# Patient Record
Sex: MALE | Race: WHITE | NOT HISPANIC OR LATINO | Employment: FULL TIME | URBAN - METROPOLITAN AREA
[De-identification: names, ages, dates, MRNs, and addresses within clinical notes are randomized per-mention and may not be internally consistent; named-entity substitution may affect disease eponyms.]

---

## 2017-01-31 ENCOUNTER — ALLSCRIPTS OFFICE VISIT (OUTPATIENT)
Dept: OTHER | Facility: OTHER | Age: 42
End: 2017-01-31

## 2017-01-31 DIAGNOSIS — Z13.6 ENCOUNTER FOR SCREENING FOR CARDIOVASCULAR DISORDERS: ICD-10-CM

## 2017-01-31 DIAGNOSIS — Z13.0 ENCOUNTER FOR SCREENING FOR DISEASES OF THE BLOOD AND BLOOD-FORMING ORGANS AND CERTAIN DISORDERS INVOLVING THE IMMUNE MECHANISM: ICD-10-CM

## 2017-01-31 DIAGNOSIS — Z13.29 ENCOUNTER FOR SCREENING FOR OTHER SUSPECTED ENDOCRINE DISORDER: ICD-10-CM

## 2017-06-01 DIAGNOSIS — R94.6 ABNORMAL RESULTS OF THYROID FUNCTION STUDIES: ICD-10-CM

## 2017-12-18 ENCOUNTER — ALLSCRIPTS OFFICE VISIT (OUTPATIENT)
Dept: OTHER | Facility: OTHER | Age: 42
End: 2017-12-18

## 2017-12-18 DIAGNOSIS — R94.6 ABNORMAL RESULTS OF THYROID FUNCTION STUDIES: ICD-10-CM

## 2017-12-18 DIAGNOSIS — M79.645 PAIN OF FINGER OF LEFT HAND: ICD-10-CM

## 2017-12-19 NOTE — PROGRESS NOTES
Assessment  1  Thumb pain, left (729 5) (M57 805)    Plan  Abnormal thyroid function test    · (1) TSH WITH FT4 REFLEX; Status:Active; Requested for:[id number]; Thumb pain, left    · Diclofenac Sodium 1 % Transdermal Gel; APPLY SPARINGLY TO AFFECTEDAREA(S) TWICE DAILY AS NEEDED   · * XR THUMB LEFT FIRST DIGIT-MIN 2V; Status:Active; Requested for:[id number];    · *1 - Ney Reilly  *Dr Ruiz Falling  Status: Active Requested for: [id number]  Care Summary provided  : Yes    Discussion/Summary    L thumb pain - suspect tendonitis with likely OA  Will obtain xray  He is a , likely will have recurring problem, will send to [facility], consider cortisone injection  Will rx diclofenac cream in the meantime  Possible side effects of new medications were reviewed with the patient/guardian today  The treatment plan was reviewed with the patient/guardian  The patient/guardian understands and agrees with the treatment plan      History of Present Illness  HPI: 45yo R handed male with abn TSH here for evaluation of L hand pain  He developed L thumb pain for one month, tried wearing a brace at work and has been placing ice and anti-inflammatory ointment without relief  Reports swelling  Denies trauma, crepitus, paresthesia  Thumb feels weaker  He is a   Review of Systems   Constitutional: no fever  Musculoskeletal: as noted in HPI  Active Problems  1  Abnormal thyroid function test (794 5) (R94 6)   2  Need for influenza vaccination (V04 81) (Z23)   3  Screening for blood disease (V78 9) (Z13 0)   4  Screening for cardiovascular condition (V81 2) (Z13 6)   5  Screening for thyroid disorder (V77 0) (Z13 29)    Past Medical History  Active Problems And Past Medical History Reviewed: The active problems and past medical history were reviewed and updated today  Surgical History  Surgical History Reviewed: The surgical history was reviewed and updated today         Social History   · Alcohol use (V49 89) (Z78 9)   · Cigarette smoker (305 1) (F17 210)   · Current every day smoker (305 1) (F17 200)   · Occupation   · [employer]   · Single  The social history was reviewed and is unchanged  Family History  Family History Reviewed: The family history was reviewed and updated today  Current Meds   1  Claritin 10 MG Oral Tablet; Therapy: (Recorded:31Jan2017) to Recorded   2  Fish Oil CAPS; Therapy: (Recorded:31Jan2017) to Recorded   3  Multivitamins Oral Capsule Recorded    The medication list was reviewed and updated today  Allergies  1  No Known Drug Allergies  2  Pollen   3  Ragweed   4  Seasonal    Vitals   Recorded: 64VXJ7554 08:23AM   Temperature 97 8 F   Heart Rate 69   Respiration 16   Systolic 067   Diastolic 86   Height 6 ft 2 in   Weight 222 lb    BMI Calculated 28 5   BSA Calculated 2 27   O2 Saturation 98     Physical Exam   Constitutional  General appearance: No acute distress, well appearing and well nourished  Cardiovascular radial pulse 2/4 b/l  Musculoskeletal  Inspection/palpation of joints, bones, and muscles: Abnormal  --  strength 5/5 b/l  L Thumb ROM WNL  Warm L thumb MCP joing with pain on palpation    Psychiatric  Orientation to person, place and time: Normal          Future Appointments    Date/Time Provider Specialty Site   02/06/2018 02:00 PM Shireen Astudillo DO Internal Medicine [facility] INTERNAL MED     Signatures   Electronically signed by : Naina Austin DO; Dec 18 2017  8:45AM EST                       (Author)

## 2018-01-12 NOTE — PROGRESS NOTES
Assessment    1  Encounter for preventive health examination (V70 0) (Z00 00)    Plan  Health Maintenance    · Begin a limited exercise program ; Status:Complete;   Done: 25DTM8141   · Drink plenty of fluids ; Status:Complete;   Done: 92MPF2434   · Eat a low fat and low cholesterol diet ; Status:Complete;   Done: 67HSN0852   · Limit your use of alcohol to 2 drinks or cans of beer a day ; Status:Complete;   Done:  94LQX4827   · Some eating tips that can help you lose weight ; Status:Complete;   Done: 28PWY6369   · We recommend that you follow these steps to lower your risk of osteoporosis  ;  Status:Complete;   Done: 48MOS0128   · You need to quit smoking ; Status:Complete;   Done: 19FSR9479   · Follow-up visit in 1 year Evaluation and Treatment  Physical  Status: Hold For -  Scheduling  Requested for: 57PKX6087  Screening for blood disease    · (1) CBC/ PLT (NO DIFF); Status:Active; Requested HFB:[id number];   Screening for cardiovascular condition    · (1) COMPREHENSIVE METABOLIC PANEL; Status:Active; Requested LUW:[id number];    · (1) LIPID PANEL FASTING W DIRECT LDL REFLEX; Status:Active; Requested  RZO:[id number];    · (1) URINALYSIS (will reflex a microscopy if leukocytes, occult blood, protein or nitrites  are not within normal limits); Status:Active; Requested JOL:46VLB0216;   Screening for thyroid disorder    · (1) TSH WITH FT4 REFLEX; Status:Active; Requested MIW:48CHF9205;     Discussion/Summary  Impression: health maintenance visit, healthy adult male  Currently, he eats a healthy diet and has an adequate exercise regimen  Prostate cancer screening: PSA is not indicated  Testicular cancer screening: the risks and benefits of testicular cancer screening were discussed and monthly self testicular exam was advised  Colorectal cancer screening: colorectal cancer screening is not indicated   The risks and benefits of immunizations were discussed, immunizations are needed and immunizations will be given as outlined in the orders  Advice and education were given regarding nutrition, aerobic exercise, weight bearing exercise, weight loss, calcium supplements, vitamin D supplements, tobacco cessation and alcohol use  Patient discussion: discussed with the patient, 40 minute visit, greater than half of the time was spent on counseling  Depression screen - negative  History of Present Illness  HM, Adult Male: The patient is being seen for a health maintenance evaluation  The last health maintenance visit was more than 1 year(s) ago  Social History: Household members include domestic partner  He is unmarried  Work status: working full time and occupation: [employer]  The patient is a current cigarette smoker  He reports occasional alcohol use  General Health: The patient's health since the last visit is described as good  He has regular dental visits (Followed by his cousin Dr Abdiaziz Cuevas)  The patient brushes time(s) a day, flosses time(s) a day, reports his last dental visit was 12/2016 and goes every 4 months  He denies vision problems  Vision care includes no need for vision correction  He denies hearing loss  Immunizations status: not up to date The patient needs the following immunization(s): tetanus vaccine and influenza vaccine  Lifestyle:  He consumes a diverse and healthy diet  He is on a low salt and eats healthy during the week but weekends has a regular diet diet  He exercises regularly  He exercises 4-5 times per week  Exercise includes aerobic conditioning and strength training  He uses tobacco  He consumes alcohol  He reports occasional alcohol use and drinking 3-5 drinks per week  Reproductive health:  the patient is sexually active  birth control is not being practiced  He denies erectile dysfunction  Screening: Prostate cancer screening includes no previous evaluation  Testicular cancer screening includes no previous evaluation  Colorectal cancer screening includes no previous screening  Metabolic screening includes lipid profile performed 2 years, glucose screening performed 2 years, thyroid function test performed 2 years and no previous DEXA  Safety elements used: seat belt, bicycle helmet, sunscreen, smoke detector and carbon monoxide detector  HPI: 34yo male here as a new patient  Review of Systems    Constitutional: No fever or chills, feels well, no tiredness, no recent weight gain or weight loss  Cardiovascular: No complaints of slow heart rate, no fast heart rate, no chest pain, no palpitations, no leg claudication, no lower extremity  Respiratory: No complaints of shortness of breath, no wheezing, no cough, no SOB on exertion, no orthopnea or PND  Gastrointestinal: heartburn  Genitourinary: No complaints of dysuria, no incontinence, no hesitancy, no nocturia, no genital lesion, no testicular pain  Musculoskeletal: finger problems and foot problems  Neurological: No compliants of headache, no confusion, no convulsions, no numbness or tingling, no dizziness or fainting, no limb weakness, no difficulty walking  Psychiatric: Is not suicidal, no sleep disturbances, no anxiety or depression, no change in personality, no emotional problems  Past Medical History    · History of Patient denies medical problems (V4) (Z78 9)    Surgical History    · History of Dental Surgery   · History of Inguinal Hernia Repair    Family History  Father    · Family history of   Paternal Grandfather    · Family history of diabetes mellitus (V18 0) (Z83 3)  Paternal Uncle    · Family history of diabetes mellitus (V18 0) (Z83 3)    Social History    · Alcohol use (V49 89) (Z78 9)   · Cigarette smoker (305 1) (F17 210)   · Occupation   · Cook   · Single    Current Meds   1  Claritin 10 MG Oral Tablet; Therapy: (Recorded:2017) to Recorded   2  Fish Oil CAPS; Therapy: (Recorded:2017) to Recorded   3  Multivitamins Oral Capsule Recorded    Allergies    1   No Known Drug Allergies    2  Pollen   3  Ragweed   4  Seasonal    Vitals   Recorded: 20YCI0192 02:22PM Recorded: 65QKZ6673 02:18PM   Temperature 97 7 F    Heart Rate 83    Respiration  16   Systolic 488    Diastolic 80    Height  6 ft 2 in   Weight  228 lb 6 08 oz   BMI Calculated  29 32   BSA Calculated  2 3   O2 Saturation 98      Physical Exam    Constitutional   General appearance: No acute distress, well appearing and well nourished  Head and Face   Head and face: Normal     Eyes   Conjunctiva and lids: No erythema, swelling or discharge  Ears, Nose, Mouth, and Throat   External inspection of ears and nose: Normal     Otoscopic examination: Tympanic membranes translucent with normal light reflex  Canals patent without erythema  Hearing: Normal     Nasal mucosa, septum, and turbinates: Normal without edema or erythema  Lips, teeth, and gums: Normal, good dentition  Oropharynx: Normal with no erythema, edema, exudate or lesions  Neck   Neck: Supple, symmetric, trachea midline, no masses  Thyroid: Normal, no thyromegaly  Pulmonary   Respiratory effort: No increased work of breathing or signs of respiratory distress  Auscultation of lungs: Clear to auscultation  Cardiovascular   Auscultation of heart: Normal rate and rhythm, normal S1 and S2, no murmurs  Carotid pulses: 2+ bilaterally  Pedal pulses: 2+ bilaterally  Examination of extremities for edema and/or varicosities: Normal     Abdomen   Abdomen: Non-tender, no masses  The abdomen was obese  Bowel sounds were normal  The abdomen was soft and nontender  The abdomen was normal to percussion  Lymphatic   Palpation of lymph nodes in neck: No lymphadenopathy  Musculoskeletal   Gait and station: Normal     Neurologic No focal deficit     Psychiatric   Orientation to person, place and time: Normal     Mood and affect: Normal        Results/Data  PHQ-2 Adult Depression Screening 31Jan2017 02:40PM [employer]     Test Name Result Flag Reference   PHQ-2 Adult Depression Score 0     Over the last two weeks, how often have you been bothered by any of the following problems?   Little interest or pleasure in doing things: Not at all - 0  Feeling down, depressed, or hopeless: Not at all - 0   PHQ-2 Adult Depression Screening Negative         Signatures   Electronically signed by : Suzan Magdaleno DO; Jan 31 2017  2:56PM EST                       (Author)

## 2018-01-15 VITALS
WEIGHT: 228.38 LBS | RESPIRATION RATE: 16 BRPM | TEMPERATURE: 97.7 F | BODY MASS INDEX: 29.31 KG/M2 | HEIGHT: 74 IN | OXYGEN SATURATION: 98 % | SYSTOLIC BLOOD PRESSURE: 125 MMHG | HEART RATE: 83 BPM | DIASTOLIC BLOOD PRESSURE: 80 MMHG

## 2018-01-23 VITALS
HEART RATE: 69 BPM | DIASTOLIC BLOOD PRESSURE: 86 MMHG | BODY MASS INDEX: 28.49 KG/M2 | HEIGHT: 74 IN | TEMPERATURE: 97.8 F | SYSTOLIC BLOOD PRESSURE: 122 MMHG | OXYGEN SATURATION: 98 % | RESPIRATION RATE: 16 BRPM | WEIGHT: 222 LBS

## 2018-03-13 ENCOUNTER — TRANSCRIBE ORDERS (OUTPATIENT)
Dept: RADIOLOGY | Facility: HOSPITAL | Age: 43
End: 2018-03-13

## 2018-03-13 ENCOUNTER — HOSPITAL ENCOUNTER (OUTPATIENT)
Dept: RADIOLOGY | Facility: HOSPITAL | Age: 43
Discharge: HOME | End: 2018-03-13
Payer: COMMERCIAL

## 2018-03-13 DIAGNOSIS — M79.645 PAIN OF FINGER OF LEFT HAND: ICD-10-CM

## 2018-03-13 PROCEDURE — 73140 X-RAY EXAM OF FINGER(S): CPT

## 2018-03-20 ENCOUNTER — TELEPHONE (OUTPATIENT)
Dept: INTERNAL MEDICINE CLINIC | Facility: CLINIC | Age: 43
End: 2018-03-20

## 2018-03-20 DIAGNOSIS — R94.6 ABNORMAL THYROID FUNCTION TEST: Primary | ICD-10-CM

## 2018-03-20 NOTE — TELEPHONE ENCOUNTER
Pt scheduled physical for next week  He does not have any BW ordered (pre-Epic)  Would you be able to place orders for the required labs and I will notify the Pt once they have been placed  Thank you!

## 2018-03-21 PROBLEM — M79.645 THUMB PAIN, LEFT: Status: ACTIVE | Noted: 2017-12-18

## 2018-03-21 PROBLEM — M19.049 CMC ARTHRITIS: Status: ACTIVE | Noted: 2018-03-21

## 2018-03-21 PROBLEM — R94.6 ABNORMAL THYROID FUNCTION TEST: Status: ACTIVE | Noted: 2017-06-01

## 2018-03-21 NOTE — TELEPHONE ENCOUNTER
As per my last note 12/18/17, only lab ordered to be done prior to our visit is a TSH  New script placed in EPIC

## 2018-03-27 ENCOUNTER — APPOINTMENT (OUTPATIENT)
Dept: LAB | Facility: HOSPITAL | Age: 43
End: 2018-03-27
Payer: COMMERCIAL

## 2018-03-27 DIAGNOSIS — R94.6 ABNORMAL THYROID FUNCTION TEST: ICD-10-CM

## 2018-03-27 LAB
T4 FREE SERPL-MCNC: 0.8 NG/DL (ref 0.76–1.46)
TSH SERPL DL<=0.05 MIU/L-ACNC: 5.23 UIU/ML (ref 0.36–3.74)

## 2018-03-27 PROCEDURE — 36415 COLL VENOUS BLD VENIPUNCTURE: CPT

## 2018-03-27 PROCEDURE — 84443 ASSAY THYROID STIM HORMONE: CPT

## 2018-03-27 PROCEDURE — 84439 ASSAY OF FREE THYROXINE: CPT

## 2018-03-28 ENCOUNTER — OFFICE VISIT (OUTPATIENT)
Dept: INTERNAL MEDICINE CLINIC | Facility: CLINIC | Age: 43
End: 2018-03-28
Payer: COMMERCIAL

## 2018-03-28 VITALS
SYSTOLIC BLOOD PRESSURE: 122 MMHG | BODY MASS INDEX: 27.36 KG/M2 | WEIGHT: 213.2 LBS | HEART RATE: 85 BPM | DIASTOLIC BLOOD PRESSURE: 80 MMHG | HEIGHT: 74 IN | TEMPERATURE: 97.8 F | RESPIRATION RATE: 16 BRPM | OXYGEN SATURATION: 97 %

## 2018-03-28 DIAGNOSIS — Z13.6 SCREENING FOR CARDIOVASCULAR CONDITION: ICD-10-CM

## 2018-03-28 DIAGNOSIS — R94.6 ABNORMAL THYROID FUNCTION TEST: ICD-10-CM

## 2018-03-28 DIAGNOSIS — Z00.00 ROUTINE ADULT HEALTH MAINTENANCE: Primary | ICD-10-CM

## 2018-03-28 PROBLEM — M79.645 THUMB PAIN, LEFT: Status: RESOLVED | Noted: 2017-12-18 | Resolved: 2018-03-28

## 2018-03-28 PROCEDURE — 99396 PREV VISIT EST AGE 40-64: CPT | Performed by: INTERNAL MEDICINE

## 2018-03-28 NOTE — PROGRESS NOTES
Assessment/Plan:    No problem-specific Assessment & Plan notes found for this encounter  1  Routine adult health maintenance     2  Screening for cardiovascular condition  Comprehensive metabolic panel   3  Abnormal thyroid function test  TSH, 3rd generation with T4 reflex       Subjective:      Patient ID: Nico Feliciano is a 36 y o  male  HPI   43yo male with abn TSH and L thumb CMC arthritis s/p cortisone injection here for yearly physical     Last dental visit 1/2018   Last eye visit never    Immunization History   Administered Date(s) Administered    Influenza Quadrivalent Preservative Free 3 years and older IM 01/31/2017    Influenza Quadrivalent, 6-35 Months IM 12/18/2017       Lifestyle:    Diet well balanced, low carb and low sodium               Physical activity goes to the gym 4-5x per week    reports that he drinks alcohol  reports that he has been smoking  He has never used smokeless tobacco     reports that he uses drugs, including Marijuana, about 3 times per week  Reproductive:     reports that he currently engages in sexual activity and has had female partners  Currently sexually active   Erectile dysfunction No    Cancer Screenings:   Testicular irregular screenings   Last PSA N/A   Colonoscopy N/A         The following portions of the patient's history were reviewed and updated as appropriate: allergies, current medications, past family history, past medical history, past social history, past surgical history and problem list     Current Outpatient Prescriptions:     loratadine (CLARITIN) 10 mg tablet, Take 10 mg by mouth, Disp: , Rfl:     Multiple Vitamin (MULTIVITAMINS PO), Take by mouth, Disp: , Rfl:     Omega-3 Fatty Acids (FISH OIL) 645 MG CAPS, Take by mouth, Disp: , Rfl:     diclofenac sodium (VOLTAREN) 1 %, Place on the skin 2 (two) times a day as needed, Disp: , Rfl:       Review of Systems   Constitutional: Negative  HENT: Negative  Respiratory: Negative  Cardiovascular: Negative  Gastrointestinal: Negative  Genitourinary: Negative  Musculoskeletal:        Thumb pain   Neurological: Negative  Psychiatric/Behavioral: Negative  Objective:    /80 (BP Location: Left arm, Patient Position: Sitting)   Pulse 85   Temp 97 8 °F (36 6 °C)   Resp 16   Ht 6' 2" (1 88 m)   Wt 96 7 kg (213 lb 3 2 oz)   SpO2 97%   BMI 27 37 kg/m²      Physical Exam   Constitutional: He is oriented to person, place, and time  He appears well-nourished  No distress  HENT:   Right Ear: External ear normal    Left Ear: External ear normal    Nose: Nose normal    Mouth/Throat: Oropharynx is clear and moist  No oropharyngeal exudate  Eyes: EOM are normal  Pupils are equal, round, and reactive to light  Neck: Neck supple  No thyromegaly present  Cardiovascular: Normal rate, regular rhythm, normal heart sounds and intact distal pulses  Pulmonary/Chest: Breath sounds normal  No respiratory distress  He has no wheezes  Abdominal: Soft  Bowel sounds are normal  He exhibits no distension  There is no tenderness  Neurological: He is alert and oriented to person, place, and time  Psychiatric: He has a normal mood and affect  His behavior is normal    Vitals reviewed            Recent Results (from the past 168 hour(s))   TSH, 3rd generation with T4 reflex    Collection Time: 03/27/18 10:23 AM   Result Value Ref Range    TSH 3RD GENERATON 5 230 (H) 0 358 - 3 740 uIU/mL   T4, free    Collection Time: 03/27/18 10:23 AM   Result Value Ref Range    Free T4 0 80 0 76 - 1 46 ng/dL     PHQ-9 Depression Screening    PHQ-9:    Frequency of the following problems over the past two weeks:       Little interest or pleasure in doing things:  0 - not at all  Feeling down, depressed, or hopeless:  0 - not at all  PHQ-2 Score:  0

## 2018-04-05 ENCOUNTER — OFFICE VISIT (OUTPATIENT)
Dept: INTERNAL MEDICINE CLINIC | Facility: CLINIC | Age: 43
End: 2018-04-05
Payer: COMMERCIAL

## 2018-04-05 VITALS
RESPIRATION RATE: 14 BRPM | DIASTOLIC BLOOD PRESSURE: 98 MMHG | TEMPERATURE: 96.7 F | BODY MASS INDEX: 27.31 KG/M2 | HEART RATE: 85 BPM | HEIGHT: 74 IN | OXYGEN SATURATION: 98 % | WEIGHT: 212.8 LBS | SYSTOLIC BLOOD PRESSURE: 168 MMHG

## 2018-04-05 DIAGNOSIS — R10.32 LEFT GROIN PAIN: Primary | ICD-10-CM

## 2018-04-05 PROCEDURE — 99213 OFFICE O/P EST LOW 20 MIN: CPT | Performed by: INTERNAL MEDICINE

## 2018-04-05 NOTE — ASSESSMENT & PLAN NOTE
eval with u/s  Advised using NSAIDs for pain control, avoiding activities that will increase intra-abd pressure and wear scrotal support in the meantime

## 2018-04-05 NOTE — PROGRESS NOTES
Assessment/Plan:    Left groin pain  eval with u/s  Advised using NSAIDs for pain control, avoiding activities that will increase intra-abd pressure and wear scrotal support in the meantime  1  Left groin pain  US groin/inguinal area       Subjective:      Patient ID: Scar Gongora is a 36 y o  male  HPI    45yo male with L thumb OA and abn TSH here for evaluation of L groin pain  One week ago he developed L groin pain, iced the area  Had sexual intercourse the next night, had hemospermia  Discomfort is present all the time but does wax and wane, worse when he is walking around, going up the steps, having a Bm  He He feels a bulge superior to L testicle  He denies dysuria, urinary frequency, back pain, scrotal swelling or fever  He has taken motrin for the pain  Had L hernia repair in 2016  The following portions of the patient's history were reviewed and updated as appropriate: allergies, current medications, past family history, past medical history, past social history, past surgical history and problem list     Current Outpatient Prescriptions:     diclofenac sodium (VOLTAREN) 1 %, Place on the skin 2 (two) times a day as needed, Disp: , Rfl:     loratadine (CLARITIN) 10 mg tablet, Take 10 mg by mouth, Disp: , Rfl:     Multiple Vitamin (MULTIVITAMINS PO), Take by mouth, Disp: , Rfl:     Omega-3 Fatty Acids (FISH OIL) 645 MG CAPS, Take by mouth, Disp: , Rfl:     Review of Systems   Constitutional: Negative for fever  Gastrointestinal: Negative  Genitourinary: Negative for difficulty urinating, dysuria, hematuria, penile swelling, scrotal swelling and testicular pain  Hemospermia x1       Objective:    /98   Pulse 85   Temp (!) 96 7 °F (35 9 °C)   Resp 14   Ht 6' 2" (1 88 m)   Wt 96 5 kg (212 lb 12 8 oz)   SpO2 98%   BMI 27 32 kg/m²      Physical Exam   Constitutional: He appears well-nourished  Abdominal: Soft  Bowel sounds are normal  He exhibits no distension  There is no tenderness  Genitourinary: Left testis shows tenderness  Left testis shows no swelling  Genitourinary Comments: Unable to palpate L inguinal bulge but tenderness is appreciated   Lymphadenopathy:        Left: No inguinal adenopathy present  Vitals reviewed

## 2018-04-10 ENCOUNTER — HOSPITAL ENCOUNTER (OUTPATIENT)
Dept: RADIOLOGY | Facility: HOSPITAL | Age: 43
Discharge: HOME | End: 2018-04-10
Payer: COMMERCIAL

## 2018-04-10 DIAGNOSIS — R10.32 LEFT GROIN PAIN: ICD-10-CM

## 2018-04-10 PROCEDURE — 76705 ECHO EXAM OF ABDOMEN: CPT

## 2018-04-17 ENCOUNTER — TELEPHONE (OUTPATIENT)
Dept: INTERNAL MEDICINE CLINIC | Facility: CLINIC | Age: 43
End: 2018-04-17

## 2018-04-18 DIAGNOSIS — R36.1 HEMOSPERMIA: Primary | ICD-10-CM

## 2018-04-18 DIAGNOSIS — R10.32 LEFT GROIN PAIN: ICD-10-CM

## 2018-04-24 ENCOUNTER — HOSPITAL ENCOUNTER (OUTPATIENT)
Dept: RADIOLOGY | Facility: HOSPITAL | Age: 43
Discharge: HOME | End: 2018-04-24
Payer: COMMERCIAL

## 2018-04-24 ENCOUNTER — TRANSCRIBE ORDERS (OUTPATIENT)
Dept: RADIOLOGY | Facility: HOSPITAL | Age: 43
End: 2018-04-24

## 2018-04-24 DIAGNOSIS — R36.1 HEMOSPERMIA: ICD-10-CM

## 2018-04-24 DIAGNOSIS — R10.32 LEFT GROIN PAIN: ICD-10-CM

## 2018-04-24 PROCEDURE — 76870 US EXAM SCROTUM: CPT

## 2018-04-30 ENCOUNTER — TELEPHONE (OUTPATIENT)
Dept: INTERNAL MEDICINE CLINIC | Facility: CLINIC | Age: 43
End: 2018-04-30

## 2018-04-30 DIAGNOSIS — R36.1 HEMOSPERMIA: ICD-10-CM

## 2018-04-30 DIAGNOSIS — I86.1 BILATERAL VARICOCELES: Primary | ICD-10-CM

## 2018-04-30 NOTE — TELEPHONE ENCOUNTER
Patient would like to know the results of his ultrasound  He also reports that all over is body is red and has a rash since Saturday   It has not improved at all

## 2018-05-01 ENCOUNTER — OFFICE VISIT (OUTPATIENT)
Dept: INTERNAL MEDICINE CLINIC | Facility: CLINIC | Age: 43
End: 2018-05-01
Payer: COMMERCIAL

## 2018-05-01 VITALS
HEART RATE: 81 BPM | WEIGHT: 213.4 LBS | TEMPERATURE: 97.7 F | SYSTOLIC BLOOD PRESSURE: 120 MMHG | OXYGEN SATURATION: 98 % | RESPIRATION RATE: 16 BRPM | BODY MASS INDEX: 27.39 KG/M2 | HEIGHT: 74 IN | DIASTOLIC BLOOD PRESSURE: 80 MMHG

## 2018-05-01 DIAGNOSIS — R21 RASH: Primary | ICD-10-CM

## 2018-05-01 PROCEDURE — 99213 OFFICE O/P EST LOW 20 MIN: CPT | Performed by: INTERNAL MEDICINE

## 2018-05-01 RX ORDER — METHYLPREDNISOLONE 4 MG/1
TABLET ORAL
Qty: 21 TABLET | Refills: 0 | Status: SHIPPED | OUTPATIENT
Start: 2018-05-01 | End: 2018-08-14 | Stop reason: ALTCHOICE

## 2018-05-01 NOTE — PROGRESS NOTES
Assessment/Plan:    1  Rash  Methylprednisolone 4 MG TBPK    advised to start medrol dose mic, side effects reviewed  if no response obtain labs  Subjective:      Patient ID: Buck Siu is a 36 y o  male  HPI   45yo male here for evaluation of a rash  He noticed a rash three days ago on his forearm that has spread  No new medications, foods, lotions or sprays or fever  Denies pruritis but feels tingly  He used benadryl spray and hydrocortisone cream with some relief  He has been taking loratadine with some relief  The following portions of the patient's history were reviewed and updated as appropriate: allergies, current medications, past family history, past medical history, past social history, past surgical history and problem list     Current Outpatient Prescriptions:     diclofenac sodium (VOLTAREN) 1 %, Place on the skin 2 (two) times a day as needed, Disp: , Rfl:     loratadine (CLARITIN) 10 mg tablet, Take 10 mg by mouth, Disp: , Rfl:     Multiple Vitamin (MULTIVITAMINS PO), Take by mouth, Disp: , Rfl:     Omega-3 Fatty Acids (FISH OIL) 645 MG CAPS, Take by mouth, Disp: , Rfl:     Methylprednisolone 4 MG TBPK, Use as directed on package, Disp: 21 tablet, Rfl: 0    Review of Systems   Constitutional: Negative for fever  Musculoskeletal: Negative for arthralgias  Skin: Positive for rash  Objective:    /80 (BP Location: Left arm, Patient Position: Sitting)   Pulse 81   Temp 97 7 °F (36 5 °C)   Resp 16   Ht 6' 2" (1 88 m)   Wt 96 8 kg (213 lb 6 4 oz)   SpO2 98%   BMI 27 40 kg/m²      Physical Exam   Constitutional: He is oriented to person, place, and time  He appears well-developed and well-nourished  Neurological: He is oriented to person, place, and time  Skin: Skin is warm  Rash noted  Diffuse maculopapular rash on bilateral arms, groin and bilateral lower extremities  Vitals reviewed

## 2018-05-09 ENCOUNTER — OFFICE VISIT (OUTPATIENT)
Dept: UROLOGY | Facility: AMBULATORY SURGERY CENTER | Age: 43
End: 2018-05-09
Payer: COMMERCIAL

## 2018-05-09 VITALS
HEIGHT: 74 IN | SYSTOLIC BLOOD PRESSURE: 128 MMHG | BODY MASS INDEX: 26.92 KG/M2 | WEIGHT: 209.8 LBS | HEART RATE: 76 BPM | DIASTOLIC BLOOD PRESSURE: 88 MMHG

## 2018-05-09 DIAGNOSIS — R36.1 HEMOSPERMIA: ICD-10-CM

## 2018-05-09 DIAGNOSIS — I86.1 BILATERAL VARICOCELES: Primary | ICD-10-CM

## 2018-05-09 DIAGNOSIS — R10.32 LEFT GROIN PAIN: ICD-10-CM

## 2018-05-09 DIAGNOSIS — A63.0 CONDYLOMA ACUMINATUM IN MALE: ICD-10-CM

## 2018-05-09 LAB
SL AMB  POCT GLUCOSE, UA: NORMAL
SL AMB LEUKOCYTE ESTERASE,UA: NORMAL
SL AMB POCT BLOOD,UA: NORMAL
SL AMB POCT CLARITY,UA: CLEAR
SL AMB POCT COLOR,UA: YELLOW
SL AMB POCT KETONES,UA: NORMAL
SL AMB POCT NITRITE,UA: NORMAL
SL AMB POCT PH,UA: 6.5
SL AMB POCT SPECIFIC GRAVITY,UA: 1.01
SL AMB POCT URINE PROTEIN: NORMAL

## 2018-05-09 PROCEDURE — 99244 OFF/OP CNSLTJ NEW/EST MOD 40: CPT | Performed by: UROLOGY

## 2018-05-09 PROCEDURE — 81002 URINALYSIS NONAUTO W/O SCOPE: CPT | Performed by: UROLOGY

## 2018-05-09 NOTE — PROGRESS NOTES
5/9/2018    Hali Yuan  1975  [id number]        Assessment  Condyloma, orchialgia, hematospermia       Discussion  I provided the patient with reassurance that his transient hematospermia a is a benign condition that requires no further evaluation or treatment  With regards to his orchialgia I recommend scrotal support and NSAID as needed  I provided him with reassurance that there are no abnormalities on physical examination of his genitalia  There are no inguinal hernias and no testicular masses  I did review 2 small lesions identified on the shaft of the phallus which are most consistent with condyloma  We discussed sharp excision in the office versus laser excision in the operating room  The patient wishes to proceed with sharp excision in the office  History of Present Illness  36 y o  male with a history of intermittent left-sided orchialgia over the course of the last 8 weeks  He also had some associated hematospermia  Both are resolving  He denies any lower urinary tract symptoms  He denies any history of STDs  He is currently sexually active with 1 partner only  He states that he had a recent systemic rash and was treated with steroids  He states that he is otherwise healthy without significant past medical or surgical history other than left inguinal hernia repair approximately 2 years ago  AUA Symptom Score  AUA SYMPTOM SCORE      Most Recent Value   AUA SYMPTOM SCORE   How often have you had a sensation of not emptying your bladder completely after you finished urinating? 1   How often have you had to urinate again less than two hours after you finished urinating? 1   How often have you found you stopped and started again several times when you urinate?  0   How often have you found it difficult to postpone urination? 0   How often have you had a weak urinary stream?  1   How often have you had to push or strain to begin urination?   0   How many times did you most typically get up to urinate from the time you went to bed at night until the time you got up in the morning? 1   Quality of Life: If you were to spend the rest of your life with your urinary condition just the way it is now, how would you feel about that?  1   AUA SYMPTOM SCORE  4          Review of Systems  Review of Systems   Constitutional: Negative  HENT: Negative  Eyes: Negative  Respiratory: Negative  Cardiovascular: Negative  Gastrointestinal: Negative  Endocrine: Negative  Genitourinary:        Hematospermia, left testicular pain   Musculoskeletal: Negative  Skin: Negative  Allergic/Immunologic: Negative  Neurological: Negative  Hematological: Negative  Psychiatric/Behavioral: Negative  All other systems reviewed and are negative  Past Medical History  History reviewed  No pertinent past medical history  Past Social History  Past Surgical History:   Procedure Laterality Date    HERNIA REPAIR Left 2016    WISDOM TOOTH EXTRACTION  2014       Past Family History  Family History   Problem Relation Age of Onset    No Known Problems Mother     Diabetes Paternal Grandfather     Diabetes Paternal Uncle        Past Social history  Social History     Social History    Marital status: Single     Spouse name: N/A    Number of children: N/A    Years of education: N/A     Occupational History    Not on file       Social History Main Topics    Smoking status: Current Every Day Smoker    Smokeless tobacco: Never Used      Comment: 3 packs per week    Alcohol use Yes      Comment: drinks 6 drinks per week    Drug use: Yes     Frequency: 3 0 times per week     Types: Marijuana      Comment: denies IVDA    Sexual activity: Yes     Partners: Female     Other Topics Concern    Not on file     Social History Narrative           Current Medications  Current Outpatient Prescriptions   Medication Sig Dispense Refill    diclofenac sodium (VOLTAREN) 1 % Place on the skin 2 (two) times a day as needed      loratadine (CLARITIN) 10 mg tablet Take 10 mg by mouth      Multiple Vitamin (MULTIVITAMINS PO) Take by mouth      Omega-3 Fatty Acids (FISH OIL) 645 MG CAPS Take by mouth      Methylprednisolone 4 MG TBPK Use as directed on package 21 tablet 0     No current facility-administered medications for this visit  Allergies  No Known Allergies    Past Medical History, Social History, Family History, medications and allergies were reviewed  Vitals  Vitals:    05/09/18 0753   BP: 128/88   BP Location: Left arm   Patient Position: Sitting   Cuff Size: Adult   Pulse: 76   Weight: 95 2 kg (209 lb 12 8 oz)   Height: 6' 2" (1 88 m)       Physical Exam  Physical Exam    On examination he is in no acute distress  His abdomen is soft nontender nondistended  A left inguinal hernia scars appreciated  There are no recurrent hernias  Testicular examination reveals no masses  Testes are appropriately descended into the scrotum  There are no varicoceles  There are 2 small condylomata on the shaft of the phallus each measuring approximately 4-5 mm in maximal diameter  There are no hydroceles  Skin is warm  Extremities without edema    Neurologic is grossly intact and nonfocal   Gait normal   Affect normal    Results  No results found for: PSA  No results found for: GLUCOSE, CALCIUM, NA, K, CO2, CL, BUN, CREATININE  No results found for: WBC, HGB, HCT, MCV, PLT      Office Urine Dip  Recent Results (from the past 1 hour(s))   POCT urine dip    Collection Time: 05/09/18  7:52 AM   Result Value Ref Range    LEUKOCYTE ESTERASE,UA -      NITRITE,UA -     SL AMB POCT URINE PROTEIN -      PH,UA 6 5      BLOOD,UA -      SPECIFIC GRAVITY,UA 1 015      KETONES,UA -     GLUCOSE, UA -      COLOR,UA yellow      CLARITY,UA clear    ]

## 2018-07-24 ENCOUNTER — PROCEDURE VISIT (OUTPATIENT)
Dept: UROLOGY | Facility: AMBULATORY SURGERY CENTER | Age: 43
End: 2018-07-24
Payer: COMMERCIAL

## 2018-07-24 VITALS
BODY MASS INDEX: 27.44 KG/M2 | WEIGHT: 213.8 LBS | DIASTOLIC BLOOD PRESSURE: 84 MMHG | HEART RATE: 76 BPM | SYSTOLIC BLOOD PRESSURE: 128 MMHG | HEIGHT: 74 IN

## 2018-07-24 DIAGNOSIS — A63.0 CONDYLOMA: Primary | ICD-10-CM

## 2018-07-24 PROCEDURE — 88305 TISSUE EXAM BY PATHOLOGIST: CPT | Performed by: PATHOLOGY

## 2018-07-24 PROCEDURE — 54060 EXCISION OF PENIS LESION(S): CPT | Performed by: UROLOGY

## 2018-07-24 NOTE — PROGRESS NOTES
Nida Jennings is a 76-year-old male with 2 lesions on the distal shaft of the phallus most consistent with condyloma  Each lesion measures approximately 4-5 mm in maximal dimension  Risk and benefits of sharp excision were discussed and reviewed informed consent was obtained  The patient's genitalia was prepped and draped in sterile fashion  2% lidocaine was utilized to anesthetize the 2 small lesions  The lesions were then elevated with Adson forceps and excised sharply with a scalpel  Skin edges were reapproximated with interrupted 4 0 chromic suture  Bacitracin ointment was applied  My impression is status post sharp excision of condyloma x2  Specimen was sent for pathology  Follow-up will be in the next 3-4 weeks for a quick wound check  If the patient continues to have testicular pain we discussed referral to interventional Radiology for embolization of his varicocele

## 2018-08-14 ENCOUNTER — OFFICE VISIT (OUTPATIENT)
Dept: INTERNAL MEDICINE CLINIC | Facility: CLINIC | Age: 43
End: 2018-08-14
Payer: COMMERCIAL

## 2018-08-14 VITALS
BODY MASS INDEX: 26.69 KG/M2 | WEIGHT: 208 LBS | TEMPERATURE: 97.1 F | HEIGHT: 74 IN | SYSTOLIC BLOOD PRESSURE: 118 MMHG | OXYGEN SATURATION: 98 % | HEART RATE: 75 BPM | RESPIRATION RATE: 16 BRPM | DIASTOLIC BLOOD PRESSURE: 70 MMHG

## 2018-08-14 DIAGNOSIS — L03.012 PARONYCHIA OF FINGER OF LEFT HAND: Primary | ICD-10-CM

## 2018-08-14 DIAGNOSIS — M19.049 CMC ARTHRITIS: ICD-10-CM

## 2018-08-14 PROCEDURE — 99213 OFFICE O/P EST LOW 20 MIN: CPT | Performed by: INTERNAL MEDICINE

## 2018-08-14 RX ORDER — SULFAMETHOXAZOLE AND TRIMETHOPRIM 800; 160 MG/1; MG/1
1 TABLET ORAL EVERY 12 HOURS SCHEDULED
Qty: 14 TABLET | Refills: 0 | Status: SHIPPED | OUTPATIENT
Start: 2018-08-14 | End: 2018-08-21

## 2018-08-14 NOTE — ASSESSMENT & PLAN NOTE
Will treat as paronychia with Bactrim BID x 7 days with re-evaluation after  Side effects reviewed  Advised to apply ice and soak finger in vinegar water as well

## 2018-08-14 NOTE — PROGRESS NOTES
Assessment/Plan:    Paronychia of finger of left hand  Will treat as paronychia with Bactrim BID x 7 days with re-evaluation after  Side effects reviewed  Advised to apply ice and soak finger in vinegar water as well  1  Paronychia of finger of left hand  sulfamethoxazole-trimethoprim (BACTRIM DS) 800-160 mg per tablet   2  CMC arthritis  diclofenac sodium (VOLTAREN) 1 %       Subjective:      Patient ID: Alo Sosa is a 39 y o  male  HPI  36yo male with abn TSH here for evaluation of L hand nail pain  Has L fifth digit pain x 2 weeks  Has applying neosporin and placing a band-aid  Reports swelling and bloody drainage  Reports increased swelling since it started  Denies trauma or fever  The following portions of the patient's history were reviewed and updated as appropriate: allergies, current medications, past family history, past medical history, past social history, past surgical history and problem list     Current Outpatient Prescriptions:     diclofenac sodium (VOLTAREN) 1 %, Apply 2 g topically 4 (four) times a day, Disp: 100 g, Rfl: 0    loratadine (CLARITIN) 10 mg tablet, Take 10 mg by mouth, Disp: , Rfl:     Multiple Vitamin (MULTIVITAMINS PO), Take by mouth, Disp: , Rfl:     Omega-3 Fatty Acids (FISH OIL) 645 MG CAPS, Take by mouth, Disp: , Rfl:     sulfamethoxazole-trimethoprim (BACTRIM DS) 800-160 mg per tablet, Take 1 tablet by mouth every 12 (twelve) hours for 7 days Take 1 tab q12h, Disp: 14 tablet, Rfl: 0      Review of Systems   Constitutional: Negative for fever  Skin: Positive for wound  Objective:    /70 (BP Location: Left arm, Patient Position: Sitting)   Pulse 75   Temp (!) 97 1 °F (36 2 °C)   Resp 16   Ht 6' 2" (1 88 m)   Wt 94 3 kg (208 lb)   SpO2 98%   BMI 26 71 kg/m²      Physical Exam   Constitutional: He appears well-developed and well-nourished  Cardiovascular:   Pulses:       Radial pulses are 2+ on the left side     Neurological: He is alert    Skin:   L medial nailfold slight swelling with skin avulsion, no drainage appreciated  Vitals reviewed

## 2018-08-21 ENCOUNTER — OFFICE VISIT (OUTPATIENT)
Dept: INTERNAL MEDICINE CLINIC | Facility: CLINIC | Age: 43
End: 2018-08-21
Payer: COMMERCIAL

## 2018-08-21 VITALS
WEIGHT: 211.4 LBS | HEIGHT: 74 IN | SYSTOLIC BLOOD PRESSURE: 120 MMHG | DIASTOLIC BLOOD PRESSURE: 80 MMHG | BODY MASS INDEX: 27.13 KG/M2 | OXYGEN SATURATION: 98 % | HEART RATE: 75 BPM | RESPIRATION RATE: 16 BRPM | TEMPERATURE: 97.4 F

## 2018-08-21 DIAGNOSIS — L60.9 NAIL ABNORMALITY: ICD-10-CM

## 2018-08-21 DIAGNOSIS — L03.012 PARONYCHIA OF FINGER OF LEFT HAND: Primary | ICD-10-CM

## 2018-08-21 PROCEDURE — 3008F BODY MASS INDEX DOCD: CPT | Performed by: INTERNAL MEDICINE

## 2018-08-21 PROCEDURE — 99213 OFFICE O/P EST LOW 20 MIN: CPT | Performed by: INTERNAL MEDICINE

## 2018-08-21 NOTE — ASSESSMENT & PLAN NOTE
Has callus-like growth on medial aspect of L fifth digit nailfold    Recommend evaluation by Liliana Rodriguez

## 2018-08-21 NOTE — PROGRESS NOTES
Assessment/Plan:    Paronychia of finger of left hand  Resolved, completed course of Bactrim  Nail abnormality  Has callus-like growth on medial aspect of L fifth digit nailfold  Recommend evaluation by Derm     1  Paronychia of finger of left hand     2  Nail abnormality  Ambulatory referral to Dermatology       Subjective:      Patient ID: Nanette Alarcon is a 39 y o  male  HPI  38yo R handed male with abn TSH here for re-evaluation of L fifth digit paronychia  Reports improvement of L finger with swelling, no drainage noted or fever  He has completed course of Bactrim with minimal loose stools  The following portions of the patient's history were reviewed and updated as appropriate: allergies, current medications, past family history, past medical history, past social history, past surgical history and problem list     Current Outpatient Prescriptions:     diclofenac sodium (VOLTAREN) 1 %, Apply 2 g topically 4 (four) times a day, Disp: 100 g, Rfl: 0    loratadine (CLARITIN) 10 mg tablet, Take 10 mg by mouth, Disp: , Rfl:     Multiple Vitamin (MULTIVITAMINS PO), Take by mouth, Disp: , Rfl:     Omega-3 Fatty Acids (FISH OIL) 645 MG CAPS, Take by mouth, Disp: , Rfl:     sulfamethoxazole-trimethoprim (BACTRIM DS) 800-160 mg per tablet, Take 1 tablet by mouth every 12 (twelve) hours for 7 days Take 1 tab q12h, Disp: 14 tablet, Rfl: 0      Review of Systems   Constitutional: Negative for fever  Skin: Positive for wound  Objective:      /80 (BP Location: Left arm, Patient Position: Sitting)   Pulse 75   Temp (!) 97 4 °F (36 3 °C)   Resp 16   Ht 6' 2" (1 88 m)   Wt 95 9 kg (211 lb 6 4 oz)   SpO2 98%   BMI 27 14 kg/m²      Physical Exam   Constitutional: No distress  Cardiovascular:   Pulses:       Radial pulses are 2+ on the left side  Neurological: He is alert  Skin:   L 5th digit medial nailfold with skin avulsion, callus-like, no drainage, erythema or tenderness noted  Vitals reviewed

## 2018-08-27 NOTE — PROGRESS NOTES
8/28/2018    Nanette Alarcon  1975  [id number]      Assessment  -Condyloma x2 s/p excision (7/24/18)    Discussion/Plan  Andrey Eubanks is a 39 y o  male being managed by Dr Claudia Rodriguez  -Reviewed results of tissue pathology with patient  After discussing with Dr Claudia Rodriguez, explained to patient that he was found to have high-grade squamous intraepithelial neoplasia at margin of larger specimen, however both lesions were negative for malignancy  His testicular pain has resolved and he denies any discomfort  If he continues to have recurrence of testicular pain we will refer to interventional Radiology for embolization of his varicocele  -Follow up in 6-8 weeks with Dr Claudia Rodriguez to re-evaluate healing wounds  -All questions answered, patient agrees with plan      History of Present Illness  39 y o  male with a history of condyloma s/p excision (7/24/18) presents today for follow up  At last office visit, 2 lesions on distal shaft of penis were removed with scalpel  Patient denies any postoperative complications or discomfort  He states that left-sided testicular discomfort from varicocele has resolved  Review of Systems  Review of Systems   Constitutional: Negative  HENT: Negative  Respiratory: Negative  Cardiovascular: Negative  Gastrointestinal: Negative  Genitourinary: Negative for decreased urine volume, difficulty urinating, dysuria, flank pain, frequency, genital sores, hematuria, testicular pain and urgency  Musculoskeletal: Negative  Skin: Negative  Neurological: Negative  Psychiatric/Behavioral: Negative  Past Medical History  No past medical history on file      Past Social History  Past Surgical History:   Procedure Laterality Date    HERNIA REPAIR Left 2016    WISDOM TOOTH EXTRACTION  2014       Past Family History  Family History   Problem Relation Age of Onset    No Known Problems Mother     Diabetes Paternal Grandfather         mellitus     Diabetes Paternal Uncle         mellitus        Past Social history  Social History     Social History    Marital status: Single     Spouse name: N/A    Number of children: N/A    Years of education: N/A     Occupational History    Cook       Social History Main Topics    Smoking status: Current Every Day Smoker     Packs/day: 0 25    Smokeless tobacco: Never Used      Comment: 3 packs per week    Alcohol use Yes      Comment: drinks 6 drinks per week    Drug use: Yes     Frequency: 3 0 times per week     Types: Marijuana      Comment: denies IVDA    Sexual activity: Yes     Partners: Female     Other Topics Concern    Not on file     Social History Narrative           Current Medications  Current Outpatient Prescriptions   Medication Sig Dispense Refill    diclofenac sodium (VOLTAREN) 1 % Apply 2 g topically 4 (four) times a day 100 g 0    loratadine (CLARITIN) 10 mg tablet Take 10 mg by mouth      Multiple Vitamin (MULTIVITAMINS PO) Take by mouth      Omega-3 Fatty Acids (FISH OIL) 645 MG CAPS Take by mouth       No current facility-administered medications for this visit  Allergies  No Known Allergies    Past Medical History, Social History, Family History, medications and allergies were reviewed  Vitals  There were no vitals filed for this visit  Physical Exam  Physical Exam   Constitutional: He is oriented to person, place, and time  He appears well-developed and well-nourished  HENT:   Head: Normocephalic  Eyes: Pupils are equal, round, and reactive to light  Neck: Normal range of motion  Cardiovascular: Normal rate and regular rhythm  Pulmonary/Chest: Effort normal    Abdominal: Soft  Normal appearance  There is no CVA tenderness  Genitourinary: Penis normal    Genitourinary Comments: 2 healing, well approximated wounds on distal shaft of penis  Left varicocele noted, nontender    Musculoskeletal: Normal range of motion     Neurological: He is alert and oriented to person, place, and time  He has normal reflexes  Skin: Skin is warm and dry  Psychiatric: He has a normal mood and affect  His behavior is normal  Judgment and thought content normal        Results    I have personally reviewed all pertinent lab results and reviewed with patient  No results found for: PSA  No results found for: GLUCOSE, CALCIUM, NA, K, CO2, CL, BUN, CREATININE  No results found for: WBC, HGB, HCT, MCV, PLT  No results found for this or any previous visit (from the past 1 hour(s))

## 2018-08-28 ENCOUNTER — TELEPHONE (OUTPATIENT)
Dept: UROLOGY | Facility: AMBULATORY SURGERY CENTER | Age: 43
End: 2018-08-28

## 2018-08-28 ENCOUNTER — OFFICE VISIT (OUTPATIENT)
Dept: UROLOGY | Facility: AMBULATORY SURGERY CENTER | Age: 43
End: 2018-08-28
Payer: COMMERCIAL

## 2018-08-28 VITALS
WEIGHT: 211.2 LBS | DIASTOLIC BLOOD PRESSURE: 80 MMHG | BODY MASS INDEX: 27.11 KG/M2 | HEART RATE: 68 BPM | SYSTOLIC BLOOD PRESSURE: 122 MMHG | HEIGHT: 74 IN

## 2018-08-28 DIAGNOSIS — A63.0 CONDYLOMA: Primary | ICD-10-CM

## 2018-08-28 PROCEDURE — 99212 OFFICE O/P EST SF 10 MIN: CPT | Performed by: NURSE PRACTITIONER

## 2018-09-05 NOTE — TELEPHONE ENCOUNTER
Appt scheduled for 10/16 at 10:45 in the [facility] office with Dr Arcadio Tomas  Please call pt with appt details

## 2018-10-16 ENCOUNTER — OFFICE VISIT (OUTPATIENT)
Dept: UROLOGY | Facility: AMBULATORY SURGERY CENTER | Age: 43
End: 2018-10-16
Payer: COMMERCIAL

## 2018-10-16 VITALS
HEART RATE: 73 BPM | WEIGHT: 212.4 LBS | SYSTOLIC BLOOD PRESSURE: 126 MMHG | HEIGHT: 74 IN | DIASTOLIC BLOOD PRESSURE: 72 MMHG | BODY MASS INDEX: 27.26 KG/M2

## 2018-10-16 DIAGNOSIS — A63.0 CONDYLOMA ACUMINATUM OF PENIS: Primary | ICD-10-CM

## 2018-10-16 PROCEDURE — 99213 OFFICE O/P EST LOW 20 MIN: CPT | Performed by: UROLOGY

## 2018-10-16 NOTE — PROGRESS NOTES
10/16/2018    Maggie Alvarenga  1975  [id number]        Assessment  Penile condyloma status post excision      Discussion  Pathology report reviewed with the patient today  A copy of the pathology was provided to the patient  We reviewed that the results are negative for malignancy  I recommend that he perform regular self examination to ensure no evidence of recurrence  The patient was instructed to call if he were to notice a recurrent skin lesion  Follow-up will be in 1 year for a skin check  If there is no evidence of recurrence the patient can return on an as needed basis  History of Present Illness  39 y o  male with a history of a penile condyloma  He underwent excision in July 2018  He returns in follow-up today  He states that the lesion is completely healed  Pathology reveals a 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  He denies any recurrence  He denies any lower urinary tract symptoms  AUA Symptom Score      Review of Systems  Review of Systems   Constitutional: Negative  HENT: Negative  Eyes: Negative  Respiratory: Negative  Cardiovascular: Negative  Gastrointestinal: Negative  Endocrine: Negative  Genitourinary: Negative  Musculoskeletal: Negative  Skin: Negative  Allergic/Immunologic: Negative  Neurological: Negative  Hematological: Negative  Psychiatric/Behavioral: Negative  Past Medical History  History reviewed  No pertinent past medical history      Past Social History  Past Surgical History:   Procedure Laterality Date    CONDYLOMA EXCISION/FULGURATION      HERNIA REPAIR Left 2016    WISDOM TOOTH EXTRACTION  2014       Past Family History  Family History   Problem Relation Age of Onset    No Known Problems Mother     Diabetes Paternal Grandfather         mellitus     Diabetes Paternal Uncle         mellitus        Past Social history  Social History     Social History    Marital status: Single     Spouse name: N/A    Number of children: N/A    Years of education: N/A     Occupational History    Cook       Social History Main Topics    Smoking status: Current Every Day Smoker     Packs/day: 0 25    Smokeless tobacco: Never Used      Comment: 3 packs per week    Alcohol use Yes      Comment: drinks 6 drinks per week    Drug use: Yes     Frequency: 3 0 times per week     Types: Marijuana      Comment: denies IVDA    Sexual activity: Yes     Partners: Female     Other Topics Concern    Not on file     Social History Narrative           Current Medications  Current Outpatient Prescriptions   Medication Sig Dispense Refill    diclofenac sodium (VOLTAREN) 1 % Apply 2 g topically 4 (four) times a day 100 g 0    loratadine (CLARITIN) 10 mg tablet Take 10 mg by mouth      Multiple Vitamin (MULTIVITAMINS PO) Take by mouth      Omega-3 Fatty Acids (FISH OIL) 645 MG CAPS Take by mouth       No current facility-administered medications for this visit  Allergies  No Known Allergies    Past Medical History, Social History, Family History, medications and allergies were reviewed  Vitals  Vitals:    10/16/18 1044   BP: 126/72   Pulse: 73   Weight: 96 3 kg (212 lb 6 4 oz)   Height: 6' 2" (1 88 m)       Physical Exam  Physical Exam    On examination he is in no acute distress  His abdomen is soft nontender nondistended   examination reveals 2 small scars on the distal phallus consistent with prior condyloma resection  There is no evidence of recurrence  Skin is warm  Extremities without edema    Neurologic is grossly intact and nonfocal   Gait normal   Affect normal     Results  No results found for: PSA  No results found for: GLUCOSE, CALCIUM, NA, K, CO2, CL, BUN, CREATININE  No results found for: WBC, HGB, HCT, MCV, PLT      Office Urine Dip  No results found for this or any previous visit (from the past 1 hour(s)) ]

## 2018-12-12 ENCOUNTER — OFFICE VISIT (OUTPATIENT)
Dept: OBGYN CLINIC | Facility: HOSPITAL | Age: 43
End: 2018-12-12
Payer: COMMERCIAL

## 2018-12-12 VITALS
DIASTOLIC BLOOD PRESSURE: 73 MMHG | WEIGHT: 212.8 LBS | SYSTOLIC BLOOD PRESSURE: 117 MMHG | BODY MASS INDEX: 27.31 KG/M2 | HEART RATE: 76 BPM | HEIGHT: 74 IN

## 2018-12-12 DIAGNOSIS — M18.12 PRIMARY OSTEOARTHRITIS OF FIRST CARPOMETACARPAL JOINT OF LEFT HAND: Primary | ICD-10-CM

## 2018-12-12 DIAGNOSIS — M19.049 CMC ARTHRITIS: ICD-10-CM

## 2018-12-12 PROCEDURE — 20600 DRAIN/INJ JOINT/BURSA W/O US: CPT | Performed by: ORTHOPAEDIC SURGERY

## 2018-12-12 PROCEDURE — 99213 OFFICE O/P EST LOW 20 MIN: CPT | Performed by: ORTHOPAEDIC SURGERY

## 2018-12-12 RX ORDER — BUPIVACAINE HYDROCHLORIDE 2.5 MG/ML
1 INJECTION, SOLUTION INFILTRATION; PERINEURAL
Status: COMPLETED | OUTPATIENT
Start: 2018-12-12 | End: 2018-12-12

## 2018-12-12 RX ORDER — BETAMETHASONE SODIUM PHOSPHATE AND BETAMETHASONE ACETATE 3; 3 MG/ML; MG/ML
6 INJECTION, SUSPENSION INTRA-ARTICULAR; INTRALESIONAL; INTRAMUSCULAR; SOFT TISSUE
Status: COMPLETED | OUTPATIENT
Start: 2018-12-12 | End: 2018-12-12

## 2018-12-12 RX ADMIN — BETAMETHASONE SODIUM PHOSPHATE AND BETAMETHASONE ACETATE 6 MG: 3; 3 INJECTION, SUSPENSION INTRA-ARTICULAR; INTRALESIONAL; INTRAMUSCULAR; SOFT TISSUE at 15:01

## 2018-12-12 RX ADMIN — BUPIVACAINE HYDROCHLORIDE 1 ML: 2.5 INJECTION, SOLUTION INFILTRATION; PERINEURAL at 15:01

## 2018-12-12 NOTE — PROGRESS NOTES
ASSESSMENT/PLAN:    Assessment:   Thumb CMC Arthritis  left    Plan:   CS injection given today  Thumb spica (comfort cool) brace given today  Ice   Activities as tolerated    Follow Up:  2  month(s)    To Do Next Visit:   re evaluate     General Discussions:     CMC Arthritis: The anatomy and physiology of carpometacarpal joint arthritis was discussed with the patient today in the office  Deterioration of the articular cartilage eventually leads to hypermobility at the thumb [facility] joint, resulting in joint subluxation, osteophyte formation, cystic changes within the trapezium and base of the first metacarpal, as well as subchondral sclerosis  Eventually, pain, limited mobility, and compensatory hyperextension at the metacarpophalangeal joint may develop  While normal activity and usage of the thumb joint may provide a painful experience to the patient, this typically does not result in damage to the thumb or hand  Treatment options include resting thumb spica splints to decreased joint edema, pain, and inflammation  Therapy exercises to strengthen the thenar musculature may relieve pain, but do not alter the overall continued development of osteoarthritis  Oral medications, topical medications, corticosteroid injections may decrease pain and increase overall function  Eventually, approximately 5% of patients may require surgical intervention  _____________________________________________________  CHIEF COMPLAINT:  Chief Complaint   Patient presents with    Left Thumb - Follow-up         SUBJECTIVE:  Saul Fontana is a 39y o  year old male who presents for follow up regarding Thumb CMC Arthritis  left  Since last visit, Saul Fontana has tried steroid injections with temporary relief  His last injection was 3/21/18 and lasted for 3-4 wks  Today there is Pain  Moderate  Intermittant  Dull and Aching to the left thumb      Radiation: None      PAST MEDICAL HISTORY:  No past medical history on file     PAST SURGICAL HISTORY:  Past Surgical History:   Procedure Laterality Date    CONDYLOMA EXCISION/FULGURATION      HERNIA REPAIR Left 2016    WISDOM TOOTH EXTRACTION  2014       FAMILY HISTORY:  Family History   Problem Relation Age of Onset    No Known Problems Mother     Diabetes Paternal Grandfather         mellitus     Diabetes Paternal Uncle         mellitus        SOCIAL HISTORY:  Social History   Substance Use Topics    Smoking status: Current Every Day Smoker     Packs/day: 0 25    Smokeless tobacco: Never Used      Comment: 3 packs per week    Alcohol use Yes      Comment: drinks 6 drinks per week       MEDICATIONS:    Current Outpatient Prescriptions:     diclofenac sodium (VOLTAREN) 1 %, Apply 2 g topically 4 (four) times a day, Disp: 100 g, Rfl: 0    loratadine (CLARITIN) 10 mg tablet, Take 10 mg by mouth, Disp: , Rfl:     Multiple Vitamin (MULTIVITAMINS PO), Take by mouth, Disp: , Rfl:     Omega-3 Fatty Acids (FISH OIL) 645 MG CAPS, Take by mouth, Disp: , Rfl:     ALLERGIES:  No Known Allergies    REVIEW OF SYSTEMS:  Pertinent items are noted in HPI  A comprehensive review of systems was negative      LABS:  HgA1c: No results found for: HGBA1C  BMP: No results found for: GLUCOSE, CALCIUM, NA, K, CO2, CL, BUN, CREATININE        _____________________________________________________  PHYSICAL EXAMINATION:  General: well developed and well nourished, alert, oriented times 3 and appears comfortable  Psychiatric: Normal  HEENT: Trachea Midline, No torticollis  Cardiovascular: No discernable arrhythmia  Pulmonary: No wheezing or stridor  Skin: No masses, erthema, lacerations, fluctation, ulcerations  Neurovascular: Sensation Intact to the Median, Ulnar, Radial Nerve, Motor Intact to the Median, Ulnar, Radial Nerve and Pulses Intact    MUSCULOSKELETAL EXAMINATION:  LEFT SIDE:  CMC: Minimal  Shoulder Sign, No Instability, Positive grind and Positive tendnerness CMC flexion extensor tendons are intact    _____________________________________________________  STUDIES REVIEWED:  No Studies to review      PROCEDURES PERFORMED:  Small joint arthrocentesis  Date/Time: 12/12/2018 3:01 PM  Consent given by: patient  Site marked: site marked  Timeout: Immediately prior to procedure a time out was called to verify the correct patient, procedure, equipment, support staff and site/side marked as required   Supporting Documentation  Indications: pain   Procedure Details  Location: thumb - L thumb CMC  Preparation: Patient was prepped and draped in the usual sterile fashion  Needle size: 25 G  Ultrasound guidance: no  Approach: radial  Medications administered: 1 mL bupivacaine 0 25 %; 6 mg betamethasone acetate-betamethasone sodium phosphate 6 (3-3) mg/mL    Patient tolerance: patient tolerated the procedure well with no immediate complications  Dressing:  Sterile dressing applied            Scribe Attestation    I,:   Andrei Linn am acting as a scribe while in the presence of the attending physician :        I,:   Emilia Tubbs MD personally performed the services described in this documentation    as scribed in my presence :          Scribe Attestation    I,:   Andrei Linn am acting as a scribe while in the presence of the attending physician :        I,:   Emilia Tubbs MD personally performed the services described in this documentation    as scribed in my presence :

## 2019-02-27 DIAGNOSIS — M19.049 CMC ARTHRITIS: ICD-10-CM

## 2019-03-20 ENCOUNTER — OFFICE VISIT (OUTPATIENT)
Dept: OBGYN CLINIC | Facility: HOSPITAL | Age: 44
End: 2019-03-20
Payer: COMMERCIAL

## 2019-03-20 VITALS
HEIGHT: 74 IN | SYSTOLIC BLOOD PRESSURE: 122 MMHG | HEART RATE: 76 BPM | WEIGHT: 210.4 LBS | DIASTOLIC BLOOD PRESSURE: 84 MMHG | BODY MASS INDEX: 27 KG/M2

## 2019-03-20 DIAGNOSIS — M18.12 PRIMARY OSTEOARTHRITIS OF FIRST CARPOMETACARPAL JOINT OF LEFT HAND: Primary | ICD-10-CM

## 2019-03-20 PROCEDURE — 20600 DRAIN/INJ JOINT/BURSA W/O US: CPT | Performed by: ORTHOPAEDIC SURGERY

## 2019-03-20 PROCEDURE — 99213 OFFICE O/P EST LOW 20 MIN: CPT | Performed by: ORTHOPAEDIC SURGERY

## 2019-03-20 RX ORDER — BETAMETHASONE SODIUM PHOSPHATE AND BETAMETHASONE ACETATE 3; 3 MG/ML; MG/ML
6 INJECTION, SUSPENSION INTRA-ARTICULAR; INTRALESIONAL; INTRAMUSCULAR; SOFT TISSUE
Status: COMPLETED | OUTPATIENT
Start: 2019-03-20 | End: 2019-03-20

## 2019-03-20 RX ORDER — MELOXICAM 7.5 MG/1
7.5 TABLET ORAL DAILY
Qty: 30 TABLET | Refills: 2 | Status: SHIPPED | OUTPATIENT
Start: 2019-03-20 | End: 2019-10-09 | Stop reason: SDUPTHER

## 2019-03-20 RX ORDER — BUPIVACAINE HYDROCHLORIDE 2.5 MG/ML
1 INJECTION, SOLUTION INFILTRATION; PERINEURAL
Status: COMPLETED | OUTPATIENT
Start: 2019-03-20 | End: 2019-03-20

## 2019-03-20 RX ADMIN — BETAMETHASONE SODIUM PHOSPHATE AND BETAMETHASONE ACETATE 6 MG: 3; 3 INJECTION, SUSPENSION INTRA-ARTICULAR; INTRALESIONAL; INTRAMUSCULAR; SOFT TISSUE at 14:22

## 2019-03-20 RX ADMIN — BUPIVACAINE HYDROCHLORIDE 1 ML: 2.5 INJECTION, SOLUTION INFILTRATION; PERINEURAL at 14:22

## 2019-03-20 NOTE — PROGRESS NOTES
ASSESSMENT/PLAN:    Assessment:   Thumb CMC Arthritis  left    Plan:   CS injection given today  Thumb spica brace as needed  Ice   Activities as tolerated  Meloxicam 7 5 mg sent to pharmacy     Follow Up:  3  month(s)    To Do Next Visit:   recheck    General Discussions:  [facility] Arthritis: The anatomy and physiology of carpometacarpal joint arthritis was discussed with the patient today in the office  Deterioration of the articular cartilage eventually leads to hypermobility at the thumb [facility] joint, resulting in joint subluxation, osteophyte formation, cystic changes within the trapezium and base of the first metacarpal, as well as subchondral sclerosis  Eventually, pain, limited mobility, and compensatory hyperextension at the metacarpophalangeal joint may develop  While normal activity and usage of the thumb joint may provide a painful experience to the patient, this typically does not result in damage to the thumb or hand  Treatment options include resting thumb spica splints to decreased joint edema, pain, and inflammation  Therapy exercises to strengthen the thenar musculature may relieve pain, but do not alter the overall continued development of osteoarthritis  Oral medications, topical medications, corticosteroid injections may decrease pain and increase overall function  Eventually, approximately 5% of patients may require surgical intervention  _____________________________________________________  CHIEF COMPLAINT:  Chief Complaint   Patient presents with    Left Thumb - Follow-up         SUBJECTIVE:  Jennifer Tong is a 39y o  year old male who presents for follow up regarding Thumb CMC Arthritis  left  Since last visit, Jennifer Tong has tried steroid injections with temporary relief  His last injection was 12/12/18 and lasted for 3-4 wks  Today there is Pain  Moderate  Intermittant  Dull and Aching to the left thumb  Radiation: None      PAST MEDICAL HISTORY:  History reviewed   No pertinent past medical history  PAST SURGICAL HISTORY:  Past Surgical History:   Procedure Laterality Date    CONDYLOMA EXCISION/FULGURATION      HERNIA REPAIR Left 2016    WISDOM TOOTH EXTRACTION  2014       FAMILY HISTORY:  Family History   Problem Relation Age of Onset    No Known Problems Mother     Diabetes Paternal Grandfather         mellitus     Diabetes Paternal Uncle         mellitus        SOCIAL HISTORY:  Social History     Tobacco Use    Smoking status: Current Every Day Smoker     Packs/day: 0 25    Smokeless tobacco: Never Used    Tobacco comment: 3 packs per week   Substance Use Topics    Alcohol use: Yes     Comment: drinks 6 drinks per week    Drug use: Yes     Frequency: 3 0 times per week     Types: Marijuana     Comment: denies IVDA       MEDICATIONS:    Current Outpatient Medications:     diclofenac sodium (VOLTAREN) 1 %, Apply 2 g topically 4 (four) times a day, Disp: 100 g, Rfl: 0    loratadine (CLARITIN) 10 mg tablet, Take 10 mg by mouth, Disp: , Rfl:     Multiple Vitamin (MULTIVITAMINS PO), Take by mouth, Disp: , Rfl:     Omega-3 Fatty Acids (FISH OIL) 645 MG CAPS, Take by mouth, Disp: , Rfl:     ALLERGIES:  No Known Allergies    REVIEW OF SYSTEMS:  Pertinent items are noted in HPI  A comprehensive review of systems was negative      LABS:  HgA1c: No results found for: HGBA1C  BMP: No results found for: GLUCOSE, CALCIUM, NA, K, CO2, CL, BUN, CREATININE        _____________________________________________________  PHYSICAL EXAMINATION:  General: well developed and well nourished, alert, oriented times 3 and appears comfortable  Psychiatric: Normal  HEENT: Trachea Midline, No torticollis  Cardiovascular: No discernable arrhythmia  Pulmonary: No wheezing or stridor  Skin: No masses, erthema, lacerations, fluctation, ulcerations  Neurovascular: Sensation Intact to the Median, Ulnar, Radial Nerve, Motor Intact to the Median, Ulnar, Radial Nerve and Pulses Intact    MUSCULOSKELETAL EXAMINATION:  LEFT SIDE: [address] :  Minimal  Shoulder Sign, No Instability, Positive grind and Positive tendnerness CMC flexion extensor tendons are intact    _____________________________________________________  STUDIES REVIEWED:  No Studies to review      PROCEDURES PERFORMED:  Small joint arthrocentesis: L thumb CMC  Date/Time: 3/20/2019 2:22 PM  Consent given by: patient  Site marked: site marked  Timeout: Immediately prior to procedure a time out was called to verify the correct patient, procedure, equipment, support staff and site/side marked as required   Supporting Documentation  Indications: pain   Procedure Details  Location: thumb - L thumb CMC  Preparation: Patient was prepped and draped in the usual sterile fashion  Needle size: 25 G  Ultrasound guidance: no  Approach: radial  Medications administered: 1 mL bupivacaine 0 25 %; 6 mg betamethasone acetate-betamethasone sodium phosphate 6 (3-3) mg/mL    Patient tolerance: patient tolerated the procedure well with no immediate complications  Dressing:  Sterile dressing applied            Scribe Attestation    I,:   Jose Francisco Garcia am acting as a scribe while in the presence of the attending physician :        I,:   Patito Waggoner MD personally performed the services described in this documentation    as scribed in my presence :          Scribe Attestation    I,:   Jose Francisco Garcia am acting as a scribe while in the presence of the attending physician :        I,:   Patito Waggoner MD personally performed the services described in this documentation    as scribed in my presence :

## 2019-04-02 ENCOUNTER — OFFICE VISIT (OUTPATIENT)
Dept: INTERNAL MEDICINE CLINIC | Facility: CLINIC | Age: 44
End: 2019-04-02
Payer: COMMERCIAL

## 2019-04-02 VITALS
SYSTOLIC BLOOD PRESSURE: 122 MMHG | RESPIRATION RATE: 16 BRPM | WEIGHT: 211 LBS | HEART RATE: 63 BPM | OXYGEN SATURATION: 98 % | DIASTOLIC BLOOD PRESSURE: 80 MMHG | BODY MASS INDEX: 27.08 KG/M2 | HEIGHT: 74 IN | TEMPERATURE: 97.7 F

## 2019-04-02 DIAGNOSIS — Z00.00 ANNUAL PHYSICAL EXAM: Primary | ICD-10-CM

## 2019-04-02 DIAGNOSIS — E66.3 OVERWEIGHT (BMI 25.0-29.9): ICD-10-CM

## 2019-04-02 DIAGNOSIS — F17.200 TOBACCO DEPENDENCE: ICD-10-CM

## 2019-04-02 DIAGNOSIS — Z13.6 SCREENING FOR CARDIOVASCULAR CONDITION: ICD-10-CM

## 2019-04-02 DIAGNOSIS — Z13.1 SCREENING FOR DIABETES MELLITUS: ICD-10-CM

## 2019-04-02 DIAGNOSIS — R94.6 ABNORMAL THYROID FUNCTION TEST: ICD-10-CM

## 2019-04-02 PROBLEM — L60.9 NAIL ABNORMALITY: Status: RESOLVED | Noted: 2018-08-21 | Resolved: 2019-04-02

## 2019-04-02 PROBLEM — R36.1 HEMOSPERMIA: Status: RESOLVED | Noted: 2018-04-18 | Resolved: 2019-04-02

## 2019-04-02 PROBLEM — L03.012 PARONYCHIA OF FINGER OF LEFT HAND: Status: RESOLVED | Noted: 2018-08-14 | Resolved: 2019-04-02

## 2019-04-02 PROCEDURE — 99396 PREV VISIT EST AGE 40-64: CPT | Performed by: INTERNAL MEDICINE

## 2019-04-30 DIAGNOSIS — M19.049 CMC ARTHRITIS: ICD-10-CM

## 2019-06-26 ENCOUNTER — OFFICE VISIT (OUTPATIENT)
Dept: OBGYN CLINIC | Facility: HOSPITAL | Age: 44
End: 2019-06-26
Payer: COMMERCIAL

## 2019-06-26 VITALS
HEART RATE: 70 BPM | DIASTOLIC BLOOD PRESSURE: 61 MMHG | SYSTOLIC BLOOD PRESSURE: 104 MMHG | BODY MASS INDEX: 26.85 KG/M2 | HEIGHT: 74 IN | WEIGHT: 209.2 LBS

## 2019-06-26 DIAGNOSIS — M18.12 PRIMARY OSTEOARTHRITIS OF FIRST CARPOMETACARPAL JOINT OF LEFT HAND: Primary | ICD-10-CM

## 2019-06-26 PROCEDURE — 99213 OFFICE O/P EST LOW 20 MIN: CPT | Performed by: ORTHOPAEDIC SURGERY

## 2019-06-26 PROCEDURE — 20600 DRAIN/INJ JOINT/BURSA W/O US: CPT | Performed by: ORTHOPAEDIC SURGERY

## 2019-06-26 RX ORDER — LIDOCAINE HYDROCHLORIDE 10 MG/ML
0.5 INJECTION, SOLUTION EPIDURAL; INFILTRATION; INTRACAUDAL; PERINEURAL
Status: COMPLETED | OUTPATIENT
Start: 2019-06-26 | End: 2019-06-26

## 2019-06-26 RX ORDER — BETAMETHASONE SODIUM PHOSPHATE AND BETAMETHASONE ACETATE 3; 3 MG/ML; MG/ML
6 INJECTION, SUSPENSION INTRA-ARTICULAR; INTRALESIONAL; INTRAMUSCULAR; SOFT TISSUE
Status: COMPLETED | OUTPATIENT
Start: 2019-06-26 | End: 2019-06-26

## 2019-06-26 RX ADMIN — LIDOCAINE HYDROCHLORIDE 0.5 ML: 10 INJECTION, SOLUTION EPIDURAL; INFILTRATION; INTRACAUDAL; PERINEURAL at 14:38

## 2019-06-26 RX ADMIN — BETAMETHASONE SODIUM PHOSPHATE AND BETAMETHASONE ACETATE 6 MG: 3; 3 INJECTION, SUSPENSION INTRA-ARTICULAR; INTRALESIONAL; INTRAMUSCULAR; SOFT TISSUE at 14:38

## 2019-07-03 DIAGNOSIS — M19.049 CMC ARTHRITIS: ICD-10-CM

## 2019-09-05 ENCOUNTER — APPOINTMENT (OUTPATIENT)
Dept: LAB | Facility: HOSPITAL | Age: 44
End: 2019-09-05
Payer: COMMERCIAL

## 2019-09-05 DIAGNOSIS — Z13.1 SCREENING FOR DIABETES MELLITUS: ICD-10-CM

## 2019-09-05 DIAGNOSIS — Z13.6 SCREENING FOR CARDIOVASCULAR CONDITION: ICD-10-CM

## 2019-09-05 DIAGNOSIS — R94.6 ABNORMAL THYROID FUNCTION TEST: ICD-10-CM

## 2019-09-05 LAB
CHOLEST SERPL-MCNC: 144 MG/DL (ref 50–200)
EST. AVERAGE GLUCOSE BLD GHB EST-MCNC: 108 MG/DL
HBA1C MFR BLD: 5.4 % (ref 4.2–6.3)
HDLC SERPL-MCNC: 61 MG/DL (ref 40–60)
LDLC SERPL CALC-MCNC: 70 MG/DL (ref 0–100)
NONHDLC SERPL-MCNC: 83 MG/DL
T4 FREE SERPL-MCNC: 0.78 NG/DL (ref 0.76–1.46)
TRIGL SERPL-MCNC: 66 MG/DL
TSH SERPL DL<=0.05 MIU/L-ACNC: 6.45 UIU/ML (ref 0.36–3.74)

## 2019-09-05 PROCEDURE — 83036 HEMOGLOBIN GLYCOSYLATED A1C: CPT

## 2019-09-05 PROCEDURE — 84439 ASSAY OF FREE THYROXINE: CPT

## 2019-09-05 PROCEDURE — 80061 LIPID PANEL: CPT

## 2019-09-05 PROCEDURE — 36415 COLL VENOUS BLD VENIPUNCTURE: CPT

## 2019-09-05 PROCEDURE — 84443 ASSAY THYROID STIM HORMONE: CPT

## 2019-09-10 ENCOUNTER — OFFICE VISIT (OUTPATIENT)
Dept: INTERNAL MEDICINE CLINIC | Facility: CLINIC | Age: 44
End: 2019-09-10
Payer: COMMERCIAL

## 2019-09-10 VITALS
SYSTOLIC BLOOD PRESSURE: 116 MMHG | DIASTOLIC BLOOD PRESSURE: 78 MMHG | HEART RATE: 60 BPM | RESPIRATION RATE: 18 BRPM | HEIGHT: 74 IN | WEIGHT: 206 LBS | OXYGEN SATURATION: 98 % | BODY MASS INDEX: 26.44 KG/M2 | TEMPERATURE: 98 F

## 2019-09-10 DIAGNOSIS — Z23 ENCOUNTER FOR IMMUNIZATION: ICD-10-CM

## 2019-09-10 DIAGNOSIS — M25.511 CHRONIC RIGHT SHOULDER PAIN: Primary | ICD-10-CM

## 2019-09-10 DIAGNOSIS — R94.6 ABNORMAL THYROID FUNCTION TEST: ICD-10-CM

## 2019-09-10 DIAGNOSIS — M19.049 CMC ARTHRITIS: ICD-10-CM

## 2019-09-10 DIAGNOSIS — G89.29 CHRONIC RIGHT SHOULDER PAIN: Primary | ICD-10-CM

## 2019-09-10 DIAGNOSIS — M25.561 ACUTE PAIN OF RIGHT KNEE: ICD-10-CM

## 2019-09-10 PROCEDURE — 99214 OFFICE O/P EST MOD 30 MIN: CPT | Performed by: INTERNAL MEDICINE

## 2019-09-10 PROCEDURE — 90471 IMMUNIZATION ADMIN: CPT

## 2019-09-10 PROCEDURE — 90686 IIV4 VACC NO PRSV 0.5 ML IM: CPT

## 2019-09-10 NOTE — ASSESSMENT & PLAN NOTE
Suspect AC joint arthritis vs bursitis  Obtain R shoulder xray  Has failed NSAIDs  Refer to Sports Medicine

## 2019-09-10 NOTE — ASSESSMENT & PLAN NOTE
Improving, at this time will monitor  Patient has diclofenac ointment that can be applied prn as well

## 2019-09-10 NOTE — PROGRESS NOTES
Assessment/Plan:    Problem List Items Addressed This Visit        Musculoskeletal and Integument    CMC arthritis     Diclofenac gel renewed  Relevant Medications    diclofenac sodium (VOLTAREN) 1 %       Other    Abnormal thyroid function test     Persistent elevated TSH with normal FT4  Obtain thyroid antibodies and recheck TSH in 6 weeks  Relevant Orders    TSH, 3rd generation with Free T4 reflex    Thyroid Antibodies Panel    Chronic right shoulder pain - Primary     Suspect AC joint arthritis vs bursitis  Obtain R shoulder xray  Has failed NSAIDs  Refer to Sports Medicine  Relevant Orders    XR shoulder 2+ vw right    Ambulatory referral to Sports Medicine    Acute pain of right knee     Improving, at this time will monitor  Patient has diclofenac ointment that can be applied prn as well  Other Visit Diagnoses     Encounter for immunization        Relevant Orders    SYRINGE/SINGLE-DOSE VIAL: influenza vaccine, [id number], quadrivalent, 0 5 mL, preservative-free (AFLURIA, FLUARIX, FLULAVAL, FLUZONE) (Completed)          Subjective:      Patient ID: Renny Shabazz is a 38 y o  male  HPI  40-year-old Rhanded male with abnormal TSH here for evaluation of R shoulder and R knee pain and to discuss recent labs  Wore compression sleeve on his R knee for five weeks, reports crepitus  He had been using the treadmill prior to pain occurring  Also applied ice when it was swollen initially  He is now doing elliptical machine  Denies back, hip pain  Reports anterior shoulder discomfort 5-6 months, reports gradual onset  He has been applying Blue emu cream with slight improvement, also has applied ice  Denies shoulder crepitus, paresthesia, neck pain  Pain is worse when he lays down on that side  He has taken Motrin prn      The following portions of the patient's history were reviewed and updated as appropriate: allergies, current medications, past family history, past medical history, past social history, past surgical history and problem list     Current Outpatient Medications:     diclofenac sodium (VOLTAREN) 1 %, Apply 2 g topically 4 (four) times a day, Disp: 100 g, Rfl: 0    loratadine (CLARITIN) 10 mg tablet, Take 10 mg by mouth, Disp: , Rfl:     meloxicam (MOBIC) 7 5 mg tablet, Take 1 tablet (7 5 mg total) by mouth daily, Disp: 30 tablet, Rfl: 2    Multiple Vitamin (MULTIVITAMINS PO), Take by mouth, Disp: , Rfl:     Omega-3 Fatty Acids (FISH OIL) 645 MG CAPS, Take by mouth, Disp: , Rfl:     Review of Systems   Constitutional: Negative for fever         +gradual weight loss   Respiratory: Negative  Cardiovascular: Negative  Endocrine: Negative for cold intolerance  Musculoskeletal: Positive for arthralgias  Negative for back pain, gait problem, neck pain and neck stiffness  R shoulder pain  R knee pain   Neurological: Negative for numbness  Objective:    /78 (BP Location: Left arm, Cuff Size: Large)   Pulse 60   Temp 98 °F (36 7 °C)   Resp 18   Ht 6' 2" (1 88 m)   Wt 93 4 kg (206 lb)   SpO2 98%   BMI 26 45 kg/m²      Physical Exam   Constitutional: No distress  Musculoskeletal:        Right shoulder: He exhibits bony tenderness  He exhibits normal range of motion, no crepitus, no spasm and normal strength  Right knee: He exhibits normal range of motion, no swelling and no effusion  No tenderness found  Cervical back: He exhibits normal range of motion and no spasm  R>L knee crepitus   Neurological: He is alert  Reflex Scores:       Patellar reflexes are 2+ on the right side and 2+ on the left side  Vitals reviewed        Recent Results (from the past 504 hour(s))   Lipid panel    Collection Time: 09/05/19  8:34 AM   Result Value Ref Range    Cholesterol 144 50 - 200 mg/dL    Triglycerides 66 <=150 mg/dL    HDL, Direct 61 (H) 40 - 60 mg/dL    LDL Calculated 70 0 - 100 mg/dL    Non-HDL-Chol (CHOL-HDL) 83 mg/dl Hemoglobin A1C    Collection Time: 09/05/19  8:34 AM   Result Value Ref Range    Hemoglobin A1C 5 4 4 2 - 6 3 %     mg/dl   TSH, 3rd generation with Free T4 reflex    Collection Time: 09/05/19  8:34 AM   Result Value Ref Range    TSH 3RD GENERATON 6 450 (H) 0 358 - 3 740 uIU/mL   T4, free    Collection Time: 09/05/19  8:34 AM   Result Value Ref Range    Free T4 0 78 0 76 - 1 46 ng/dL

## 2019-09-19 ENCOUNTER — HOSPITAL ENCOUNTER (OUTPATIENT)
Dept: RADIOLOGY | Facility: HOSPITAL | Age: 44
Discharge: HOME | End: 2019-09-19
Payer: COMMERCIAL

## 2019-09-19 ENCOUNTER — TRANSCRIBE ORDERS (OUTPATIENT)
Dept: RADIOLOGY | Facility: HOSPITAL | Age: 44
End: 2019-09-19

## 2019-09-19 DIAGNOSIS — G89.29 CHRONIC RIGHT SHOULDER PAIN: ICD-10-CM

## 2019-09-19 DIAGNOSIS — M25.511 CHRONIC RIGHT SHOULDER PAIN: ICD-10-CM

## 2019-09-19 PROCEDURE — 73030 X-RAY EXAM OF SHOULDER: CPT

## 2019-09-24 ENCOUNTER — OFFICE VISIT (OUTPATIENT)
Dept: OBGYN CLINIC | Facility: OTHER | Age: 44
End: 2019-09-24
Payer: COMMERCIAL

## 2019-09-24 VITALS
HEIGHT: 74 IN | WEIGHT: 207 LBS | DIASTOLIC BLOOD PRESSURE: 79 MMHG | HEART RATE: 56 BPM | SYSTOLIC BLOOD PRESSURE: 131 MMHG | BODY MASS INDEX: 26.56 KG/M2

## 2019-09-24 DIAGNOSIS — M75.21 BICIPITAL TENDONITIS OF SHOULDER, RIGHT: Primary | ICD-10-CM

## 2019-09-24 DIAGNOSIS — G25.89 SCAPULAR DYSKINESIS: ICD-10-CM

## 2019-09-24 PROCEDURE — 99214 OFFICE O/P EST MOD 30 MIN: CPT | Performed by: ORTHOPAEDIC SURGERY

## 2019-09-24 NOTE — PROGRESS NOTES
Assessment:       1  Bicipital tendonitis of shoulder, right    2  Scapular dyskinesis          Plan:        Clinical and prior radiological findings discussed with patient today; consistent with right-sided bicipital tendinitis as well as scapular dyskinesis  Instructed patient to improve right upper extremity mechanics as well as Graston techniques through formal physical therapy for 6-8 weeks  If no improvement consider corticosteroid injection of bicipital tendon sheath a follow-up visit  Subjective:     Patient ID: Sara Young is a 38 y o  male  Chief Complaint:  Right shoulder pain     HPI  58-year-old male here today from referral by his primary physician, Dr Harvey Camacho, in regards to evaluation of right shoulder pain  Ongoing issue for the past 6 months approximately, no precipitating event  Progressively becoming more painful during ROM  Has been trying ice, NSAIDs, and Blue Emu cream with only mild relief  Prior x-ray ordered on 09/19/2019 has no official report, but appears to have no osseous abnormality by my interpretation  He reports today that pain is still present over anterior shoulder, sometimes radiates to medial right scapular area  Pain aggravated while doing chest presses at the gym and when lying on right shoulder at night  Denies swelling, erythema, numbness/tingling, weakness of his upper extremities bilaterally, neck pain   Denies prior surgery/injury of right shoulder in the past      Social History     Occupational History    Occupation: [employer]    Tobacco Use    Smoking status: Current Every Day Smoker     Packs/day: 0 25    Smokeless tobacco: Never Used    Tobacco comment: 3 packs per week   Substance and Sexual Activity    Alcohol use: Yes     Comment: drinks 6 drinks per week    Drug use: Yes     Frequency: 3 0 times per week     Types: Marijuana     Comment: denies IVDA    Sexual activity: Yes     Partners: Female      Review of Systems   Constitutional: Negative for chills, fever and unexpected weight change  HENT: Negative for hearing loss, nosebleeds and sore throat  Eyes: Negative for pain, redness and visual disturbance  Respiratory: Negative for cough, shortness of breath and wheezing  Cardiovascular: Negative for chest pain, palpitations and leg swelling  Gastrointestinal: Negative for abdominal pain, nausea and vomiting  Endocrine: Negative for polyphagia and polyuria  Genitourinary: Negative for dysuria and hematuria  Musculoskeletal:        As per HPI   Skin: Negative for rash and wound  Neurological:        As per HPI   Psychiatric/Behavioral: Negative for decreased concentration and suicidal ideas  The patient is not nervous/anxious  Objective:     Ortho ExamPhysical Exam   Constitutional: He appears well-developed and well-nourished  No distress  HENT:   Head: Normocephalic and atraumatic  Right Ear: External ear normal    Left Ear: External ear normal    Eyes: Conjunctivae are normal  No scleral icterus  Neck: Normal range of motion  Neck supple  Cardiovascular: Normal rate  Pulmonary/Chest: Effort normal  No respiratory distress  Abdominal: Soft  Neurological: He is alert  Skin: Skin is warm and dry  He is not diaphoretic  No erythema     Psychiatric: His behavior is normal        Ortho Exam:  BACK EXAM:  Right scapula with increased elevation and protraction compared to left scapula    RIGHT SHOULDER:  Erythema: no  Swelling: no  Increased Warmth: no    Tenderness: + at bicipital groove and coracoid process    ROM  Abducts 180 degrees; symmetric with left shoulder  ER to 80 degrees; symmetric with left shoulder  IR to T10; symmetric with left shoulder    Strength  Abduction: 5/5  ER: 5/5  IR: 4/5    Drop-Arm: negative  Emptycan: negative  Belly Press: negative  Lift-off Test: negative    Ozuna: negative  Neer: negative  Cross-Arm: negative  Speeds: negative  Yergason's:   Positive      Labral Crank Test Sabrina Peace negative  [place]'s Test:   Negative  Apprehension: negative        I have personally reviewed pertinent films in PACS and my interpretation is No osseous abnormality of right shoulder x-ray on 09/19/2019  I interviewed, took the history and examined the patient  I discussed the case with the Resident and reviewed the Residents note , prescribed medications, and orders placed  I supervised the Resident and I agree with the Resident management plan as it was presented to me  I was present in the clinic and examined the patient      Sheree Gowers, MD 09/25/19

## 2019-10-03 ENCOUNTER — EVALUATION (OUTPATIENT)
Dept: PHYSICAL THERAPY | Facility: OTHER | Age: 44
End: 2019-10-03
Payer: COMMERCIAL

## 2019-10-03 DIAGNOSIS — M75.21 BICIPITAL TENDONITIS OF SHOULDER, RIGHT: ICD-10-CM

## 2019-10-03 DIAGNOSIS — G25.89 SCAPULAR DYSKINESIS: ICD-10-CM

## 2019-10-03 PROCEDURE — 97140 MANUAL THERAPY 1/> REGIONS: CPT | Performed by: PHYSICAL THERAPIST

## 2019-10-03 PROCEDURE — 97161 PT EVAL LOW COMPLEX 20 MIN: CPT | Performed by: PHYSICAL THERAPIST

## 2019-10-03 PROCEDURE — 97110 THERAPEUTIC EXERCISES: CPT | Performed by: PHYSICAL THERAPIST

## 2019-10-03 NOTE — PROGRESS NOTES
PT Evaluation     Today's date: 10/3/2019  Patient name: Fabian Waller  : 1975  MRN: [id number]  Referring provider: Katherine Broussard MD  Dx:   Encounter Diagnosis     ICD-10-CM    1  Bicipital tendonitis of shoulder, right M75 21 Ambulatory referral to Physical Therapy   2  Scapular dyskinesis G25 89 Ambulatory referral to Physical Therapy                  Assessment  Assessment details: Fabian Waller is a pleasant 38 y o  male who presents with  R shoulder pain worst  With lifitng he would like to get back to lifitng patient  Is a runner no pain with this  He has not had any injection  Pain with sleeping side  Pain with OHA  Pain with sleeping at night  HEP issued and POC reviewed  Impairments: abnormal coordination, abnormal muscle firing, abnormal or restricted ROM, abnormal movement, activity intolerance, difficulty understanding, impaired physical strength, lacks appropriate home exercise program, pain with function and poor body mechanics    Symptom irritability: moderateUnderstanding of Dx/Px/POC: good   Prognosis: good    Goals  Short Term:  Pt will report decreased levels of pain by at least 2 subjective ratings in 4 weeks  Pt will demonstrate improved ROM by at least 10 degrees in 4 weeks  Pt will demonstrate improved strength by 1/2 grade  Long Term:   Pt will be independent in their HEP in 8 weeks  Pt will be be pain free with IADL's  Pt will demonstrate improved FOTO, > 80         Plan  Plan details: Prognosis above is given PT services 2x/week tapering to 1x/week over the next 3 months and home program adherence    Patient would benefit from: skilled physical therapy  Planned modality interventions: thermotherapy: hydrocollator packs  Planned therapy interventions: activity modification, joint mobilization, manual therapy, motor coordination training, neuromuscular re-education, patient education, self care, therapeutic activities, therapeutic exercise, graded activity and home exercise program  Frequency: 2x week  Treatment plan discussed with: patient        Subjective Evaluation    Pain  At best pain ratin  At worst pain ratin  Location: R shoulder     Patient Goals  Patient goals for therapy: decreased pain, improved balance, increased motion, independence with ADLs/IADLs, return to sport/leisure activities and increased strength          Objective     General Comments:      Shoulder Comments   Special test   Shoulder =- joint mob = -   apprehension test=   - Anterior drawer test =  -  [place] test=  Pain both postions   biceps load=-   NEER's=  -Ozuna/Edwin test = - Apley's scratch test= mild tightess   Empty can test=     Speed's test=   danay impingement test = +   passive compression test=  lift off =  belly press= -   ER lag= -   Scapular exam:  Weakness 4/5mmt   PROM flex  abd: IR: 60 ER: 90   AROM flex  WFL abd:WFL  IR: ER  Mild pain ant shoulder     TTP over coracoid process     GH mmt 4/5         Flowsheet Rows      Most Recent Value   PT/OT G-Codes   Current Score  68   Projected Score  78             Precautions: R shoulder       Manual  10  3 19            graston ant shoulder through pec MJ 5min             Inf and post glides of the shoulder  MJ                                                       Exercise Diary              UBE retro             Prone YTI              Sleepgertrudis stertch std              Bicep stretch              T-S ext foam roll              Post capsule stretch                                                                                                                                                                                                        Modalities

## 2019-10-08 ENCOUNTER — OFFICE VISIT (OUTPATIENT)
Dept: PHYSICAL THERAPY | Facility: OTHER | Age: 44
End: 2019-10-08
Payer: COMMERCIAL

## 2019-10-08 DIAGNOSIS — M75.21 BICIPITAL TENDONITIS OF SHOULDER, RIGHT: Primary | ICD-10-CM

## 2019-10-08 DIAGNOSIS — G25.89 SCAPULAR DYSKINESIS: ICD-10-CM

## 2019-10-08 PROCEDURE — 97140 MANUAL THERAPY 1/> REGIONS: CPT | Performed by: PHYSICAL THERAPIST

## 2019-10-08 NOTE — PROGRESS NOTES
Daily Note     Today's date: 10/8/2019  Patient name: Laurence Meehan  : 1975  MRN: [id number]  Referring provider: Ashwin Mathew MD  Dx:   Encounter Diagnosis     ICD-10-CM    1  Bicipital tendonitis of shoulder, right M75 21    2  Scapular dyskinesis G25 89                   Subjective: patient tolerted progression noticing  a little improvement with the IE and HEP/       Objective: See treatment diary below      Assessment: Tolerated treatment well  Patient demonstrated fatigue post treatment      Plan: Continue per plan of care  Precautions: R shoulder       Manual  10  3 19 10 8           graston ant shoulder through pec MJ 5min  MJ 5min            Inf and post glides of the shoulder  MJ MJ            ISTM UT R   MJ                                          Exercise Diary  10 8            UBE retro 4min             Prone TI  10x3 #1             Sleeper stertch std  10''x10             Bicep stretch  10''x10            T-S ext foam roll  5''x10            Post capsule stretch              scap punches  #4 20             Levator stretch  10''x10             S/L #3  10x3            LPD/ Rows gtb 10x2             IR/ER   gtb 10x2             Wall walk  CW/cc otb 2x                                                                                                                         Modalities

## 2019-10-09 ENCOUNTER — OFFICE VISIT (OUTPATIENT)
Dept: OBGYN CLINIC | Facility: HOSPITAL | Age: 44
End: 2019-10-09
Payer: COMMERCIAL

## 2019-10-09 VITALS
DIASTOLIC BLOOD PRESSURE: 71 MMHG | HEART RATE: 69 BPM | WEIGHT: 214 LBS | SYSTOLIC BLOOD PRESSURE: 117 MMHG | BODY MASS INDEX: 27.46 KG/M2 | HEIGHT: 74 IN

## 2019-10-09 DIAGNOSIS — M18.12 PRIMARY OSTEOARTHRITIS OF FIRST CARPOMETACARPAL JOINT OF LEFT HAND: Primary | ICD-10-CM

## 2019-10-09 PROCEDURE — 20600 DRAIN/INJ JOINT/BURSA W/O US: CPT | Performed by: ORTHOPAEDIC SURGERY

## 2019-10-09 PROCEDURE — 99213 OFFICE O/P EST LOW 20 MIN: CPT | Performed by: ORTHOPAEDIC SURGERY

## 2019-10-09 RX ORDER — BETAMETHASONE SODIUM PHOSPHATE AND BETAMETHASONE ACETATE 3; 3 MG/ML; MG/ML
6 INJECTION, SUSPENSION INTRA-ARTICULAR; INTRALESIONAL; INTRAMUSCULAR; SOFT TISSUE
Status: COMPLETED | OUTPATIENT
Start: 2019-10-09 | End: 2019-10-09

## 2019-10-09 RX ORDER — LIDOCAINE HYDROCHLORIDE 10 MG/ML
0.5 INJECTION, SOLUTION EPIDURAL; INFILTRATION; INTRACAUDAL; PERINEURAL
Status: COMPLETED | OUTPATIENT
Start: 2019-10-09 | End: 2019-10-09

## 2019-10-09 RX ORDER — MELOXICAM 7.5 MG/1
7.5 TABLET ORAL DAILY
Qty: 30 TABLET | Refills: 2 | Status: SHIPPED | OUTPATIENT
Start: 2019-10-09 | End: 2020-11-11 | Stop reason: SDUPTHER

## 2019-10-09 RX ADMIN — LIDOCAINE HYDROCHLORIDE 0.5 ML: 10 INJECTION, SOLUTION EPIDURAL; INFILTRATION; INTRACAUDAL; PERINEURAL at 14:54

## 2019-10-09 RX ADMIN — BETAMETHASONE SODIUM PHOSPHATE AND BETAMETHASONE ACETATE 6 MG: 3; 3 INJECTION, SUSPENSION INTRA-ARTICULAR; INTRALESIONAL; INTRAMUSCULAR; SOFT TISSUE at 14:54

## 2019-10-09 NOTE — PROGRESS NOTES
ASSESSMENT/PLAN:    Assessment:   Thumb CMC Arthritis  left    Plan:   Resume activities as tolerated and steroid injection    Follow Up:  3  month(s)    To Do Next Visit:   recheck    General Discussions:  CMC Arthritis: The anatomy and physiology of carpometacarpal joint arthritis was discussed with the patient today in the office  Deterioration of the articular cartilage eventually leads to hypermobility at the thumb [facility], resulting in joint subluxation, osteophyte formation, cystic changes within the trapezium and base of the first metacarpal, as well as subchondral sclerosis  Eventually, pain, limited mobility, and compensatory hyperextension at the metacarpophalangeal joint may develop  While normal activity and usage of the thumb joint may provide a painful experience to the patient, this typically does not result in damage to the thumb or hand  Treatment options include resting thumb spica splints to decreased joint edema, pain, and inflammation  Therapy exercises to strengthen the thenar musculature may relieve pain, but do not alter the overall continued development of osteoarthritis  Oral medications, topical medications, corticosteroid injections may decrease pain and increase overall function  Eventually, approximately 5% of patients may require surgical intervention  _____________________________________________________  CHIEF COMPLAINT:  Chief Complaint   Patient presents with    Left Thumb - Follow-up         SUBJECTIVE:  Marce Medrano is a 38y o  year old male who presents for follow up regarding Thumb CMC Arthritis  left  Since last visit, Marce Medrano has tried steroid injections with only partial relief  Today there is Pain  Moderate  Intermittant  Dull and Aching to the left CMC joint  Radiation: None      PAST MEDICAL HISTORY:  No past medical history on file      PAST SURGICAL HISTORY:  Past Surgical History:   Procedure Laterality Date    CONDYLOMA EXCISION/FULGURATION      HERNIA REPAIR Left 2016    WISDOM TOOTH EXTRACTION  2014       FAMILY HISTORY:  Family History   Problem Relation Age of Onset    No Known Problems Mother     Diabetes Paternal Grandfather         mellitus     Diabetes Paternal Uncle         mellitus        SOCIAL HISTORY:  Social History     Tobacco Use    Smoking status: Current Every Day Smoker     Packs/day: 0 25    Smokeless tobacco: Never Used    Tobacco comment: 3 packs per week   Substance Use Topics    Alcohol use: Yes     Comment: drinks 6 drinks per week    Drug use: Yes     Frequency: 3 0 times per week     Types: Marijuana     Comment: denies IVDA       MEDICATIONS:    Current Outpatient Medications:     diclofenac sodium (VOLTAREN) 1 %, Apply 2 g topically 4 (four) times a day, Disp: 100 g, Rfl: 0    loratadine (CLARITIN) 10 mg tablet, Take 10 mg by mouth, Disp: , Rfl:     meloxicam (MOBIC) 7 5 mg tablet, Take 1 tablet (7 5 mg total) by mouth daily, Disp: 30 tablet, Rfl: 2    Multiple Vitamin (MULTIVITAMINS PO), Take by mouth, Disp: , Rfl:     Omega-3 Fatty Acids (FISH OIL) 645 MG CAPS, Take by mouth, Disp: , Rfl:     ALLERGIES:  No Known Allergies    REVIEW OF SYSTEMS:  Pertinent items are noted in HPI  A comprehensive review of systems was negative      LABS:  HgA1c:   Lab Results   Component Value Date    HGBA1C 5 4 09/05/2019     BMP: No results found for: GLUCOSE, CALCIUM, NA, K, CO2, CL, BUN, CREATININE        _____________________________________________________    PHYSICAL EXAMINATION:  /71   Pulse 69   Ht 6' 2" (1 88 m)   Wt 97 1 kg (214 lb)   BMI 27 48 kg/m²   General: well developed and well nourished, alert, oriented times 3 and appears comfortable  Psychiatric: Normal  HEENT: Trachea Midline, No torticollis  Cardiovascular: No discernable arrhythmia  Pulmonary: No wheezing or stridor  Skin: No masses, erythema, lacerations, fluctation, ulcerations  Neurovascular: Sensation Intact to the Median, Ulnar, Radial Nerve, Motor Intact to the Median, Ulnar, Radial Nerve and Pulses Intact    MUSCULOSKELETAL EXAMINATION:  LEFT SIDE:  CMC: Minimal  Shoulder Sign, No Instability, Positive grind and Positive tendnerness CMC flexion extensor tendons are intact    _____________________________________________________  STUDIES REVIEWED:  No Studies to review      PROCEDURES PERFORMED:  Small joint arthrocentesis: L thumb CMC  Date/Time: 10/9/2019 2:54 PM  Consent given by: patient  Site marked: site marked  Timeout: Immediately prior to procedure a time out was called to verify the correct patient, procedure, equipment, support staff and site/side marked as required   Supporting Documentation  Indications: pain   Procedure Details  Location: thumb - L thumb CMC  Preparation: Patient was prepped and draped in the usual sterile fashion  Needle size: 25 G  Ultrasound guidance: no  Approach: radial  Medications administered: 6 mg betamethasone acetate-betamethasone sodium phosphate 6 (3-3) mg/mL; 0 5 mL lidocaine (PF) 1 %    Patient tolerance: patient tolerated the procedure well with no immediate complications  Dressing:  Sterile dressing applied               Scribe Attestation    I,:   Steffi Wolfe am acting as a scribe while in the presence of the attending physician :        I,:   Maxine Ring MD personally performed the services described in this documentation    as scribed in my presence :

## 2019-10-17 ENCOUNTER — OFFICE VISIT (OUTPATIENT)
Dept: PHYSICAL THERAPY | Facility: OTHER | Age: 44
End: 2019-10-17
Payer: COMMERCIAL

## 2019-10-17 DIAGNOSIS — M75.21 BICIPITAL TENDONITIS OF SHOULDER, RIGHT: Primary | ICD-10-CM

## 2019-10-17 DIAGNOSIS — G25.89 SCAPULAR DYSKINESIS: ICD-10-CM

## 2019-10-17 PROCEDURE — 97110 THERAPEUTIC EXERCISES: CPT | Performed by: PHYSICAL THERAPIST

## 2019-10-17 PROCEDURE — 97140 MANUAL THERAPY 1/> REGIONS: CPT | Performed by: PHYSICAL THERAPIST

## 2019-10-17 PROCEDURE — 97112 NEUROMUSCULAR REEDUCATION: CPT | Performed by: PHYSICAL THERAPIST

## 2019-10-17 NOTE — PROGRESS NOTES
Daily Note     Today's date: 10/17/2019  Patient name: Berenice Powers  : 1975  MRN: [id number]  Referring provider: Vincenzo Hutchinson MD  Dx:   Encounter Diagnosis     ICD-10-CM    1  Bicipital tendonitis of shoulder, right M75 21    2  Scapular dyskinesis G25 89                   Subjective: less pain benching and god releif with self TRP  Objective: See treatment diary below      Assessment: Tolerated treatment well  Patient demonstrated fatigue post treatment      Plan: Continue per plan of care  Precautions: R shoulder       Manual  10  3 19 10 8 10 17          graston ant shoulder through pec MJ 5min  MJ 5min  MJ5min           Inf and post glides of the shoulder  MJ MJ  MJ          ISTM UT R   MJ  MJ                                        Exercise Diary  10 8 10 17           UBE retro 4min  5min            Prone TI  10x3 #1  10x3 #1 TYI            Sleeper stertch std  10''x10  10''x10           Bicep stretch  10''x10            T-S ext foam roll  5''x10 5''x10            Post capsule stretch   With strap post glide 5''x1           scap punches  #4 20  #5 20            Levator stretch  10''x10  10''x10           S/L #3  10x3 #5 10x3            LPD/ Rows gtb 10x2  btb 10x3            IR/ER   gtb 10x2  btb 10x3            Wall walk  CW/cc otb 2x  gtb 2x            Self trp tennis ball     2min pec and prox bicep                                                                                                           Modalities

## 2019-10-21 ENCOUNTER — APPOINTMENT (OUTPATIENT)
Dept: PHYSICAL THERAPY | Facility: OTHER | Age: 44
End: 2019-10-21
Payer: COMMERCIAL

## 2019-10-24 ENCOUNTER — OFFICE VISIT (OUTPATIENT)
Dept: PHYSICAL THERAPY | Facility: OTHER | Age: 44
End: 2019-10-24
Payer: COMMERCIAL

## 2019-10-24 DIAGNOSIS — G25.89 SCAPULAR DYSKINESIS: ICD-10-CM

## 2019-10-24 DIAGNOSIS — M75.21 BICIPITAL TENDONITIS OF SHOULDER, RIGHT: Primary | ICD-10-CM

## 2019-10-24 PROCEDURE — 97140 MANUAL THERAPY 1/> REGIONS: CPT | Performed by: PHYSICAL THERAPIST

## 2019-10-24 PROCEDURE — 97110 THERAPEUTIC EXERCISES: CPT | Performed by: PHYSICAL THERAPIST

## 2019-10-24 PROCEDURE — 97112 NEUROMUSCULAR REEDUCATION: CPT | Performed by: PHYSICAL THERAPIST

## 2019-10-29 ENCOUNTER — OFFICE VISIT (OUTPATIENT)
Dept: UROLOGY | Facility: AMBULATORY SURGERY CENTER | Age: 44
End: 2019-10-29
Payer: COMMERCIAL

## 2019-10-29 VITALS
HEART RATE: 64 BPM | WEIGHT: 211 LBS | SYSTOLIC BLOOD PRESSURE: 118 MMHG | BODY MASS INDEX: 27.08 KG/M2 | HEIGHT: 74 IN | DIASTOLIC BLOOD PRESSURE: 70 MMHG

## 2019-10-29 DIAGNOSIS — A63.0 CONDYLOMA ACUMINATA: Primary | ICD-10-CM

## 2019-10-29 PROCEDURE — 99213 OFFICE O/P EST LOW 20 MIN: CPT | Performed by: NURSE PRACTITIONER

## 2019-10-29 NOTE — PATIENT INSTRUCTIONS
Follow up as needed  Continue to perform skin checks and call the office for enlarging or areas of concern

## 2019-10-29 NOTE — PROGRESS NOTES
10/29/2019    Chief Complaint   Patient presents with    Penile condyloma     Assessment and Plan    38 y o  male managed by Dr Ry Pruitt    1  Penile condyloma  · Status post excision 7/2018  · Skin checked in the office today- small area of question noted to the right lateral penile shaft  · Continue to perform frequent skin checks  · Follow up in the office p r n  History of Present Illness  Fabian Waller is a 38 y o  male here for follow up evaluation of  penile condyloma and is status post excision 07/2018  Cytology was negative for malignancy  Surgical states x2 well-healed small area of question noted on the right lateral penile shaft patient reports performing frequent skin checks to his penis and scrotum with no other areas of concern were noticed  Denies all lower urinary tract symptoms including dysuria, hematuria, urinary frequency and urgency  He denies significant family history of prostate cancer and is aware to return to the office at the age of 53 to begin routine prostate cancer screening with PSA and CHASE  Review of Systems   Constitutional: Negative for chills and fever  Respiratory: Negative for cough and shortness of breath  Cardiovascular: Negative for chest pain  Gastrointestinal: Negative for abdominal distention, abdominal pain, blood in stool, nausea and vomiting  Genitourinary: Negative for difficulty urinating, dysuria, enuresis, flank pain, frequency, hematuria and urgency  Musculoskeletal: Negative for back pain  Skin: Negative for rash  Neurological: Negative for dizziness  Past Medical History  History reviewed  No pertinent past medical history      Past Social History  Past Surgical History:   Procedure Laterality Date    CONDYLOMA EXCISION/FULGURATION      HERNIA REPAIR Left 2016    WISDOM TOOTH EXTRACTION  2014     Social History     Tobacco Use   Smoking Status Current Every Day Smoker    Packs/day: 0 25   Smokeless Tobacco Never Used   Tobacco Comment    3 packs per week       Past Family History  Family History   Problem Relation Age of Onset    No Known Problems Mother     Diabetes Paternal Grandfather         mellitus     Diabetes Paternal Uncle         mellitus        Past Social history  Social History     Socioeconomic History    Marital status: Single     Spouse name: Not on file    Number of children: Not on file    Years of education: Not on file    Highest education level: Not on file   Occupational History    Occupation: [employer]    Social Needs    Financial resource strain: Not on file    Food insecurity:     Worry: Not on file     Inability: Not on file   [employer] needs:     Medical: Not on file     Non-medical: Not on file   Tobacco Use    Smoking status: Current Every Day Smoker     Packs/day: 0 25    Smokeless tobacco: Never Used    Tobacco comment: 3 packs per week   Substance and Sexual Activity    Alcohol use: Yes     Comment: drinks 6 drinks per week    Drug use: Yes     Frequency: 3 0 times per week     Types: Marijuana     Comment: denies IVDA    Sexual activity: Yes     Partners: Female   Lifestyle    Physical activity:     Days per week: Not on file     Minutes per session: Not on file    Stress: Not on file   Relationships    Social connections:     Talks on phone: Not on file     Gets together: Not on file     Attends Shinto service: Not on file     Active member of club or organization: Not on file     Attends meetings of clubs or organizations: Not on file     Relationship status: Not on file    Intimate partner violence:     Fear of current or ex partner: Not on file     Emotionally abused: Not on file     Physically abused: Not on file     Forced sexual activity: Not on file   Other Topics Concern    Not on file   Social History Narrative           Current Medications  Current Outpatient Medications   Medication Sig Dispense Refill    diclofenac sodium (VOLTAREN) 1 % Apply 2 g topically 4 (four) times a day 100 g 0    loratadine (CLARITIN) 10 mg tablet Take 10 mg by mouth      meloxicam (MOBIC) 7 5 mg tablet Take 1 tablet (7 5 mg total) by mouth daily 30 tablet 2    Multiple Vitamin (MULTIVITAMINS PO) Take by mouth      Omega-3 Fatty Acids (FISH OIL) 645 MG CAPS Take by mouth       No current facility-administered medications for this visit  Allergies  No Known Allergies      The following portions of the patient's history were reviewed and updated as appropriate: allergies, current medications, past medical history, past social history, past surgical history and problem list       Vitals  Vitals:    10/29/19 1025   BP: 118/70   BP Location: Right arm   Patient Position: Sitting   Cuff Size: Adult   Pulse: 64   Weight: 95 7 kg (211 lb)   Height: 6' 2" (1 88 m)     Physical Exam  Physical Exam   Constitutional: He is oriented to person, place, and time  He appears well-developed and well-nourished  HENT:   Head: Atraumatic  Eyes: EOM are normal    Neck: Neck supple  Pulmonary/Chest: Effort normal  No respiratory distress  Genitourinary: Circumcised  Genitourinary Comments: Right lateral penile shaft with questionable small raised area  Left epididymal head cyst palpable  Asymptomatic   Musculoskeletal: Normal range of motion  Neurological: He is alert and oriented to person, place, and time  Skin: Skin is warm and dry  Vitals reviewed  Results  No results found for this or any previous visit (from the past 1 hour(s))  ]  No results found for: PSA  No results found for: GLUCOSE, CALCIUM, NA, K, CO2, CL, BUN, CREATININE  No results found for: WBC, HGB, HCT, MCV, PLT    Orders  No orders of the defined types were placed in this encounter        MONIQUE Salazar

## 2019-11-05 ENCOUNTER — OFFICE VISIT (OUTPATIENT)
Dept: OBGYN CLINIC | Facility: OTHER | Age: 44
End: 2019-11-05
Payer: COMMERCIAL

## 2019-11-05 VITALS
HEIGHT: 74 IN | BODY MASS INDEX: 27.21 KG/M2 | HEART RATE: 53 BPM | WEIGHT: 212 LBS | SYSTOLIC BLOOD PRESSURE: 127 MMHG | DIASTOLIC BLOOD PRESSURE: 78 MMHG

## 2019-11-05 DIAGNOSIS — M75.21 BICIPITAL TENDONITIS OF SHOULDER, RIGHT: Primary | ICD-10-CM

## 2019-11-05 DIAGNOSIS — G25.89 SCAPULAR DYSKINESIS: ICD-10-CM

## 2019-11-05 PROCEDURE — 99213 OFFICE O/P EST LOW 20 MIN: CPT | Performed by: ORTHOPAEDIC SURGERY

## 2019-11-05 NOTE — PROGRESS NOTES
Assessment:       1  Bicipital tendonitis of shoulder, right    2  Scapular dyskinesis          Plan:        I explained my current clinical findings to Mr  Brianna Bell  Clinically, he has a right shoulder bicipital tenosynovitis and possibly some tendinosis of his subscapularis  At this time, he is interested in receiving a right shoulder ultrasound-guided bicipital tendon sheath cortisone injection  We will schedule for the same  In the interim, I have advised him to continue with his shoulder range of motion and strengthening exercises  Subjective:     Patient ID: Tegan Cameron is a 38 y o  male  Chief Complaint:  Follow-up right shoulder pain    HPI  41-year-old right-hand-dominant male ronan here today regards to follow-up evaluation of chronic right shoulder pain  Last evaluated on 09/24/2019 which he reported is an ongoing issue for the past 6 months  On exam, his symptoms were consistent with bicipital tendinitis as well as scapular dyskinesia  He was referred to physical therapy for further management  Reports today that his right anterior shoulder pain is persistent  Sharp, intermittent mild to moderate intensity made worse with forward flexion and lifting objects  Denies any new injury  Denies any lateral posterior shoulder pain on today's office visit  Has done 2 sessions of physical therapy in this regard, is interested in receiving a biceps tendon sheath cortisone injection  Social History     Occupational History    Occupation: [employer]    Tobacco Use    Smoking status: Current Every Day Smoker     Packs/day: 0 25    Smokeless tobacco: Never Used    Tobacco comment: 3 packs per week   Substance and Sexual Activity    Alcohol use: Yes     Comment: drinks 6 drinks per week    Drug use: Yes     Frequency: 3 0 times per week     Types: Marijuana     Comment: denies IVDA    Sexual activity: Yes     Partners: Female      Review of Systems   Constitutional: Negative    Negative for chills, fever and unexpected weight change  HENT: Negative  Negative for hearing loss, nosebleeds and sore throat  Eyes: Negative  Negative for pain, redness and visual disturbance  Respiratory: Negative  Negative for cough, shortness of breath and wheezing  Cardiovascular: Negative  Negative for chest pain, palpitations and leg swelling  Gastrointestinal: Negative  Negative for abdominal pain, nausea and vomiting  Endocrine: Negative  Negative for polyphagia and polyuria  Genitourinary: Negative  Negative for dysuria and hematuria  Musculoskeletal:        As per HPI   Skin: Negative  Negative for rash and wound  Allergic/Immunologic: Negative  Neurological: Negative  As per HPI   Psychiatric/Behavioral: Negative  Negative for decreased concentration and suicidal ideas  The patient is not nervous/anxious  Objective:     Ortho ExamPhysical Exam   Constitutional: He is oriented to person, place, and time  He appears well-developed and well-nourished  No distress  HENT:   Head: Normocephalic and atraumatic  Right Ear: External ear normal    Left Ear: External ear normal    Eyes: Conjunctivae are normal    Neck: Normal range of motion  Neck supple  Cardiovascular: Normal rate and regular rhythm  Pulmonary/Chest: Effort normal  No respiratory distress  Abdominal: Soft  Neurological: He is alert and oriented to person, place, and time  Skin: Skin is warm and dry  He is not diaphoretic  No erythema  Psychiatric: He has a normal mood and affect  His behavior is normal  Judgment and thought content normal    Nursing note and vitals reviewed  Right shoulder exam:  No deformity or asymmetry  Tender to palpation over the long head of the biceps in the bicipital groove  No other areas of bony or joint line tenderness of the right shoulder girdle  Full range of right shoulder movement in all directions without discomfort  Negative empty can  Negative Ozuna  Negative Neer's  Negative anterior apprehension  Positive Portland's  Positive speed's  Negative Yergason's  Rotator cuff strength is 5/5 except subscapularis which is 4+/5 with mild discomfort  Clinically intact distal neurovascular status  I have personally reviewed pertinent films in PACS and my interpretation is X-ray right shoulder 09/19/2019 showing no osseous abnormality

## 2019-11-06 ENCOUNTER — TELEPHONE (OUTPATIENT)
Dept: OBGYN CLINIC | Facility: OTHER | Age: 44
End: 2019-11-06

## 2019-11-06 NOTE — TELEPHONE ENCOUNTER
Left message for patient to give me a call back  I asked him to call us aback and ask for Aura at Dr David Rowley office so this appointment cane be scheduled correctly

## 2019-11-19 ENCOUNTER — OFFICE VISIT (OUTPATIENT)
Dept: OBGYN CLINIC | Facility: OTHER | Age: 44
End: 2019-11-19
Payer: COMMERCIAL

## 2019-11-19 VITALS
SYSTOLIC BLOOD PRESSURE: 124 MMHG | BODY MASS INDEX: 27.34 KG/M2 | HEIGHT: 74 IN | DIASTOLIC BLOOD PRESSURE: 82 MMHG | HEART RATE: 63 BPM | WEIGHT: 213 LBS

## 2019-11-19 DIAGNOSIS — M75.21 BICIPITAL TENDONITIS OF SHOULDER, RIGHT: Primary | ICD-10-CM

## 2019-11-19 PROCEDURE — 99213 OFFICE O/P EST LOW 20 MIN: CPT | Performed by: ORTHOPAEDIC SURGERY

## 2019-11-19 PROCEDURE — 76942 ECHO GUIDE FOR BIOPSY: CPT | Performed by: ORTHOPAEDIC SURGERY

## 2019-11-19 NOTE — PROGRESS NOTES
Injection tendon sheath ligament single     Date/Time 11/19/2019 12:24 PM     Performed by  Aditya Clay MD     Authorized by Aditya Clay MD      Universal Protocol Consent: Verbal consent obtained  Risks and benefits: risks, benefits and alternatives were discussed  Consent given by: patient  Patient understanding: patient states understanding of the procedure being performed  Patient consent: the patient's understanding of the procedure matches consent given  Procedure consent: procedure consent matches procedure scheduled  Relevant documents: relevant documents present and verified  Test results: test results available and properly labeled  Site marked: the operative site was marked  Radiology Images displayed and confirmed  If images not available, report reviewed: imaging studies available  Patient identity confirmed: verbally with patient  Time out: Immediately prior to procedure a "time out" was called to verify the correct patient, procedure, equipment, support staff and site/side marked as required  Preparation: Patient was prepped and draped in the usual sterile fashion  Site preparation: Chlorhexidine, Chloraprep and Betadine    Local anesthesia used: no     Anesthesia   Local anesthesia used: no     Sedation   Patient sedated: no        Specimen: no    Culture: no   Procedure Details   Procedure Notes: Injection of the right proximal bicipital tendon sheath  1 ml of plain 1% lidocaine (NDC:1496-8156-67)  1 mg of betamethasone (NDC:0812-2496-47)  Patient tolerance: Patient tolerated the procedure well with no immediate complications               I interviewed, took the history and examined the patient  I discussed the case with the Resident and reviewed the Residents note , prescribed medications, and orders placed  I supervised the Resident and I agree with the Resident management plan as it was presented to me  I was present in the clinic and examined the patient   I directly supervised performance of the above procedure      Gerhard Shin MD 11/21/19

## 2019-12-04 DIAGNOSIS — M19.049 CMC ARTHRITIS: ICD-10-CM

## 2019-12-05 NOTE — PROGRESS NOTES
Assessment/Plan:    Problem List Items Addressed This Visit        Musculoskeletal and Integument    Boil of buttock - Primary     R buttock, instructed to apply warm compresses multiple times per day, start Bactrim DS, side effects discussed  Patient to call in 3 days, if no drainage noted then will send for I&D  Relevant Medications    sulfamethoxazole-trimethoprim (BACTRIM DS) 800-160 mg per tablet          Subjective:      Patient ID: Di Nolasco is a 38 y o  male  HPI  36yo male with arthritis here for evaluation of buttock pain  Reports boil in his R buttock, has pain with sitting  Present x 1 week, started as a pimple then grew in size  Has applied neosporin, motrin for pain and hot rag once sometimes twice daily without improvement  No drainage noted, nausea  Reports subjective fever two days ago  No previous episode  The following portions of the patient's history were reviewed and updated as appropriate: allergies, current medications, past family history, past medical history, past social history, past surgical history and problem list       Current Outpatient Medications:     diclofenac sodium (VOLTAREN) 1 %, Apply 2 g topically 4 (four) times a day, Disp: 100 g, Rfl: 0    loratadine (CLARITIN) 10 mg tablet, Take 10 mg by mouth, Disp: , Rfl:     meloxicam (MOBIC) 7 5 mg tablet, Take 1 tablet (7 5 mg total) by mouth daily, Disp: 30 tablet, Rfl: 2    Multiple Vitamin (MULTIVITAMINS PO), Take by mouth, Disp: , Rfl:     Omega-3 Fatty Acids (FISH OIL) 645 MG CAPS, Take by mouth, Disp: , Rfl:     sulfamethoxazole-trimethoprim (BACTRIM DS) 800-160 mg per tablet, Take 1 tablet by mouth every 12 (twelve) hours for 7 days Take 1 tab q12h, Disp: 14 tablet, Rfl: 0    Review of Systems   Constitutional: Positive for fatigue and fever  Respiratory: Negative  Gastrointestinal: Negative  Skin: Positive for color change and wound  Neurological: Negative          Objective:    BP 130/84 (BP Location: Left arm, Patient Position: Sitting, Cuff Size: Adult)   Pulse 70   Temp 97 6 °F (36 4 °C) (Tympanic)   Ht 6' 2" (1 88 m)   Wt 95 3 kg (210 lb)   SpO2 99%   BMI 26 96 kg/m²      Physical Exam   Constitutional: He appears well-developed and well-nourished  No distress  Neurological: He is alert  Skin: Skin is warm  There is erythema  Psychiatric: He has a normal mood and affect  His behavior is normal    Vitals reviewed

## 2019-12-06 ENCOUNTER — OFFICE VISIT (OUTPATIENT)
Dept: INTERNAL MEDICINE CLINIC | Facility: CLINIC | Age: 44
End: 2019-12-06
Payer: COMMERCIAL

## 2019-12-06 VITALS
HEART RATE: 70 BPM | TEMPERATURE: 97.6 F | SYSTOLIC BLOOD PRESSURE: 130 MMHG | HEIGHT: 74 IN | OXYGEN SATURATION: 99 % | WEIGHT: 210 LBS | DIASTOLIC BLOOD PRESSURE: 84 MMHG | BODY MASS INDEX: 26.95 KG/M2

## 2019-12-06 DIAGNOSIS — L02.32 BOIL OF BUTTOCK: Primary | ICD-10-CM

## 2019-12-06 PROCEDURE — 3008F BODY MASS INDEX DOCD: CPT | Performed by: INTERNAL MEDICINE

## 2019-12-06 PROCEDURE — 4004F PT TOBACCO SCREEN RCVD TLK: CPT | Performed by: INTERNAL MEDICINE

## 2019-12-06 PROCEDURE — 99214 OFFICE O/P EST MOD 30 MIN: CPT | Performed by: INTERNAL MEDICINE

## 2019-12-06 RX ORDER — SULFAMETHOXAZOLE AND TRIMETHOPRIM 800; 160 MG/1; MG/1
1 TABLET ORAL EVERY 12 HOURS SCHEDULED
Qty: 14 TABLET | Refills: 0 | Status: SHIPPED | OUTPATIENT
Start: 2019-12-06 | End: 2019-12-13

## 2019-12-06 NOTE — ASSESSMENT & PLAN NOTE
R buttock, instructed to apply warm compresses multiple times per day, start Bactrim DS, side effects discussed  Patient to call in 3 days, if no drainage noted then will send for I&D

## 2019-12-06 NOTE — PATIENT INSTRUCTIONS
Warm Compress or Soak   AMBULATORY CARE:   What you need to know about a warm compress or soak:  A warm compress or soak helps improve blood flow to tissues and relieve pain and swelling  This will help you heal from an injury or illness  You may need a warm compress or soak to help manage any of the following:  · A sinus infection or upper respiratory infection    · A blocked tear duct, eye infection, or a stye    · A skin abscess or infection    · An ingrown toenail    · An ear infection    · A soft or deep tissue injury    · A muscle or joint injury, such as a sprain  Contact your healthcare provider if:   · Your symptoms do not improve or you have new symptoms  · You see blisters on the area where you applied the compress or soak  · You have questions or concerns about your condition or care  How to prepare and use a moist warm compress: Your healthcare provider will tell you how often to apply a warm compress:  · Wash your hands  · Use a washcloth, small towel, or gauze as your compress  · You can place the compress under running water or place it in a bowl with warm water  Check the temperature of the water with a thermometer  The water should not be warmer than 100°F for babies, 105°F for children, and 120°F for adults  Adults should use water that is 105°F if they will apply the compress to an eye  · If directed, add 1 tablespoon of salt to the water  Squeeze extra water out of the compress  · Place the compress directly on the area  If directed, gently massage the area with the compress  Check your skin in 2 minutes for blisters or bright red skin  Your skin should look pink to light red  · You may need to rewarm the compress every 5 minutes  · Remove the compress in 15 to 30 minutes, or when the compress starts to feel cold  Gently pat your skin dry with a clean towel  · Wash your hands  · Reapply the compress as many times as directed each day   Use a clean compress every time  How to use a dry warm compress:  A dry compress may be a hot water bottle or a heating pad  You can also buy a prepared hot pack  Follow the package directions for how to use these devices  Cover a bottle or hot pack with a towel before you apply it to your skin  Do not leave a dry compress on your skin for more than 20 minutes or as directed  Do not fall asleep with a dry compress on your skin  A dry compress may burn your skin if it is left on for too long  How to prepare and use a warm soak:   · Fill a clean container or tub with warm water and soap  The container should be deep enough to cover the area completely  · Check the temperature of the water with a thermometer  The water should not be warmer than 100°F for children and babies, and 110°F for adults  · If directed, add 1 tablespoon of salt to the water  · Remove any bandages  · Soak the area for 30 minutes or as long as directed  Gently pat your skin dry when you are done soaking  · Replace bandages as directed  · Clean the container or tub when finished  · Wash your hands  Follow up with your healthcare provider as directed:  Write down your questions so you remember to ask them during your visits  © 2017 [facility] Information is for End User's use only and may not be sold, redistributed or otherwise used for commercial purposes  All illustrations and images included in CareNotes® are the copyrighted property of [employer]  or Reji Fuentes  The above information is an  only  It is not intended as medical advice for individual conditions or treatments  Talk to your doctor, nurse or pharmacist before following any medical regimen to see if it is safe and effective for you

## 2020-01-28 ENCOUNTER — OFFICE VISIT (OUTPATIENT)
Dept: OBGYN CLINIC | Facility: OTHER | Age: 45
End: 2020-01-28
Payer: COMMERCIAL

## 2020-01-28 VITALS
SYSTOLIC BLOOD PRESSURE: 123 MMHG | BODY MASS INDEX: 26.95 KG/M2 | DIASTOLIC BLOOD PRESSURE: 80 MMHG | HEIGHT: 74 IN | HEART RATE: 54 BPM | WEIGHT: 210 LBS

## 2020-01-28 DIAGNOSIS — M75.21 BICIPITAL TENDONITIS OF SHOULDER, RIGHT: Primary | ICD-10-CM

## 2020-01-28 DIAGNOSIS — G25.89 SCAPULAR DYSKINESIS: ICD-10-CM

## 2020-01-28 PROCEDURE — 99213 OFFICE O/P EST LOW 20 MIN: CPT | Performed by: ORTHOPAEDIC SURGERY

## 2020-01-28 RX ORDER — SULFAMETHOXAZOLE
POWDER (GRAM) MISCELLANEOUS
COMMUNITY
Start: 2019-12-06 | End: 2022-05-10 | Stop reason: ALTCHOICE

## 2020-01-28 NOTE — PROGRESS NOTES
Assessment:       1  Bicipital tendonitis of shoulder, right    2  Scapular dyskinesis          Plan:        Clinical findings discussed with patient today - patient reports complete resolution of bicipital tendonitis pain after cortisone injection and formal physical therapy  Counseled patient to continue scapular stabilization exercises to prevent flaring of his bicipital tendonitis in the future  Subjective:     Patient ID: Pepper Escalante is a 38 y o  male  Chief Complaint:  Follow-up right bicipital pain    HPI  The 69-year-old male here today for follow-up evaluation of right shoulder pain secondary to bicipital tendinitis  Last evaluated on 11/21/2019 in which he was given a cortisone injection of his bicipital tendon sheath  He reports today that he feels 100% back to baseline  After the cortisone injection of his bicepital tendon sheath, it took about a  couple weeks for the relief to take effect  He feels his strength has returned and denies numbness/tingling, weakness, any new injury  He continues to work on home exercises for his scapular dyskinesia  Social History     Occupational History    Occupation: [employer]    Tobacco Use    Smoking status: Current Every Day Smoker     Packs/day: 0 25    Smokeless tobacco: Never Used    Tobacco comment: 3 packs per week   Substance and Sexual Activity    Alcohol use: Yes     Comment: drinks 6 drinks per week    Drug use: Yes     Frequency: 3 0 times per week     Types: Marijuana     Comment: denies IVDA    Sexual activity: Yes     Partners: Female      Review of Systems   Constitutional: Negative for chills, fever and unexpected weight change  HENT: Negative for hearing loss, nosebleeds and sore throat  Eyes: Negative for pain, redness and visual disturbance  Respiratory: Negative for cough, shortness of breath and wheezing  Cardiovascular: Negative for chest pain, palpitations and leg swelling     Gastrointestinal: Negative for abdominal pain, nausea and vomiting  Endocrine: Negative for polyphagia and polyuria  Genitourinary: Negative for dysuria and hematuria  Musculoskeletal:        As per HPI   Skin: Negative for rash and wound  Neurological:        As per HPI   Psychiatric/Behavioral: Negative for decreased concentration and suicidal ideas  The patient is not nervous/anxious  Objective:     Ortho ExamPhysical Exam   Constitutional: He is oriented to person, place, and time  He appears well-developed and well-nourished  No distress  HENT:   Head: Normocephalic and atraumatic  Right Ear: External ear normal    Left Ear: External ear normal    Eyes: Conjunctivae are normal  No scleral icterus  Neck: Normal range of motion  Neck supple  Cardiovascular: Normal rate  Pulmonary/Chest: Effort normal  No respiratory distress  Abdominal: Soft  Neurological: He is alert and oriented to person, place, and time  Skin: Skin is warm and dry  He is not diaphoretic  Psychiatric: His behavior is normal        Ortho Exam:  RIGHT SHOULDER:  On inspection of his scapula during range of motion, there is only mild elevation of right scapula compared to left  Non-tender to palpation around shoulder and over proximal bicipital tendon  Full range of motion on abduction/flexion/IR/ER  Strength  Abduction: 5/5  ER: 5/5  IR: 5/5    Drop-Arm: negative  Emptycan: negative  Belly Press: negative    Ozuna: negative  Neer: negative  Cross-Arm: negative  Speeds: negative    I have personally reviewed pertinent films in PACS and my interpretation is Prior radiographs of his right shoulder on 09/19/2019 with no acute osseous abnormality       I interviewed, took the history and examined the patient  I discussed the case with the Resident and reviewed the Residents note , prescribed medications, and orders placed  I supervised the Resident and I agree with the Resident management plan as it was presented to me    I was present in the clinic and examined the patient      Gerhard Shin MD 01/29/20

## 2020-01-28 NOTE — PATIENT INSTRUCTIONS
Continue to work on lower back with exercises described today to prevent your bicipital tendonitis from flaring up in the future

## 2020-02-05 ENCOUNTER — OFFICE VISIT (OUTPATIENT)
Dept: INTERNAL MEDICINE CLINIC | Facility: CLINIC | Age: 45
End: 2020-02-05
Payer: COMMERCIAL

## 2020-02-05 VITALS
RESPIRATION RATE: 16 BRPM | OXYGEN SATURATION: 99 % | TEMPERATURE: 97.8 F | SYSTOLIC BLOOD PRESSURE: 126 MMHG | BODY MASS INDEX: 27.08 KG/M2 | DIASTOLIC BLOOD PRESSURE: 78 MMHG | HEIGHT: 74 IN | HEART RATE: 70 BPM | WEIGHT: 211 LBS

## 2020-02-05 DIAGNOSIS — L72.9 CYST OF BUTTOCKS: Primary | ICD-10-CM

## 2020-02-05 DIAGNOSIS — Z11.4 SCREENING FOR HIV (HUMAN IMMUNODEFICIENCY VIRUS): ICD-10-CM

## 2020-02-05 PROCEDURE — 3008F BODY MASS INDEX DOCD: CPT | Performed by: INTERNAL MEDICINE

## 2020-02-05 PROCEDURE — 4004F PT TOBACCO SCREEN RCVD TLK: CPT | Performed by: INTERNAL MEDICINE

## 2020-02-05 PROCEDURE — 99213 OFFICE O/P EST LOW 20 MIN: CPT | Performed by: INTERNAL MEDICINE

## 2020-02-05 NOTE — PROGRESS NOTES
Assessment/Plan:    Problem List Items Addressed This Visit        Other    Cyst of buttocks - Primary     Early presentation  Patient reassurred, advised to continue with warm compress multiple times per day and topical antibiotic 3x per day  Has had multiple episodes, recommend labs  He is not diabetic based on A1c several months ago  Call if not improved  Relevant Orders    CBC and differential      Other Visit Diagnoses     Screening for HIV (human immunodeficiency virus)        Relevant Orders    [facility] HIV 1/2 AG-AB combo          Subjective:      Patient ID: Ana María Cook is a 38 y o  male  HPI  38yo male with arthritis here for evaluation of L buttock lesion  Reports he is starting with a third boil, initial was in his R buttock and most recently two weeks ago on L posterior thigh  He has responded to antibiotics  Now he has noticed one on his L buttock three days ago  It has not changed in size for now, it is painful  He has applied compresses and neosporin  Denies fever or drainage  The following portions of the patient's history were reviewed and updated as appropriate: allergies, current medications, past family history, past medical history, past social history, past surgical history and problem list       Current Outpatient Medications:     diclofenac sodium (VOLTAREN) 1 %, Apply 2 g topically 4 (four) times a day, Disp: 100 g, Rfl: 0    loratadine (CLARITIN) 10 mg tablet, Take 10 mg by mouth, Disp: , Rfl:     meloxicam (MOBIC) 7 5 mg tablet, Take 1 tablet (7 5 mg total) by mouth daily, Disp: 30 tablet, Rfl: 2    Multiple Vitamin (MULTIVITAMINS PO), Take by mouth, Disp: , Rfl:     Omega-3 Fatty Acids (FISH OIL) 645 MG CAPS, Take by mouth, Disp: , Rfl:     Sulfamethoxazole Micro POWD, , Disp: , Rfl:     Review of Systems   Constitutional: Negative for fever  Skin: Positive for color change  Negative for pallor, rash and wound         Objective:    /78 Pulse 70   Temp 97 8 °F (36 6 °C)   Resp 16   Ht 6' 2" (1 88 m)   Wt 95 7 kg (211 lb)   SpO2 99%   BMI 27 09 kg/m²      Physical Exam   Constitutional: He appears well-developed and well-nourished  No distress  Neurological: He is alert  Skin: Skin is intact  He is not diaphoretic  L buttock 5mm circular red erythema without induration, minimal tenderness to touch   Psychiatric: He has a normal mood and affect  His behavior is normal  Judgment and thought content normal    Vitals reviewed

## 2020-02-06 ENCOUNTER — APPOINTMENT (OUTPATIENT)
Dept: LAB | Facility: HOSPITAL | Age: 45
End: 2020-02-06
Payer: COMMERCIAL

## 2020-02-06 DIAGNOSIS — L72.9 CYST OF BUTTOCKS: ICD-10-CM

## 2020-02-06 DIAGNOSIS — Z11.4 SCREENING FOR HIV (HUMAN IMMUNODEFICIENCY VIRUS): ICD-10-CM

## 2020-02-06 DIAGNOSIS — R94.6 ABNORMAL THYROID FUNCTION TEST: ICD-10-CM

## 2020-02-06 LAB
BASOPHILS # BLD AUTO: 0.03 THOUSANDS/ΜL (ref 0–0.1)
BASOPHILS NFR BLD AUTO: 1 % (ref 0–1)
EOSINOPHIL # BLD AUTO: 0.11 THOUSAND/ΜL (ref 0–0.61)
EOSINOPHIL NFR BLD AUTO: 2 % (ref 0–6)
ERYTHROCYTE [DISTWIDTH] IN BLOOD BY AUTOMATED COUNT: 12.5 % (ref 11.6–15.1)
HCT VFR BLD AUTO: 46.5 % (ref 36.5–49.3)
HGB BLD-MCNC: 15.4 G/DL (ref 12–17)
IMM GRANULOCYTES # BLD AUTO: 0.02 THOUSAND/UL (ref 0–0.2)
IMM GRANULOCYTES NFR BLD AUTO: 0 % (ref 0–2)
LYMPHOCYTES # BLD AUTO: 2.07 THOUSANDS/ΜL (ref 0.6–4.47)
LYMPHOCYTES NFR BLD AUTO: 37 % (ref 14–44)
MCH RBC QN AUTO: 31.2 PG (ref 26.8–34.3)
MCHC RBC AUTO-ENTMCNC: 33.1 G/DL (ref 31.4–37.4)
MCV RBC AUTO: 94 FL (ref 82–98)
MONOCYTES # BLD AUTO: 0.66 THOUSAND/ΜL (ref 0.17–1.22)
MONOCYTES NFR BLD AUTO: 12 % (ref 4–12)
NEUTROPHILS # BLD AUTO: 2.69 THOUSANDS/ΜL (ref 1.85–7.62)
NEUTS SEG NFR BLD AUTO: 48 % (ref 43–75)
NRBC BLD AUTO-RTO: 0 /100 WBCS
PLATELET # BLD AUTO: 165 THOUSANDS/UL (ref 149–390)
PMV BLD AUTO: 10.3 FL (ref 8.9–12.7)
RBC # BLD AUTO: 4.94 MILLION/UL (ref 3.88–5.62)
T4 FREE SERPL-MCNC: 0.97 NG/DL (ref 0.76–1.46)
TSH SERPL DL<=0.05 MIU/L-ACNC: 5.6 UIU/ML (ref 0.36–3.74)
WBC # BLD AUTO: 5.58 THOUSAND/UL (ref 4.31–10.16)

## 2020-02-06 PROCEDURE — 85025 COMPLETE CBC W/AUTO DIFF WBC: CPT

## 2020-02-06 PROCEDURE — 84443 ASSAY THYROID STIM HORMONE: CPT

## 2020-02-06 PROCEDURE — 86376 MICROSOMAL ANTIBODY EACH: CPT

## 2020-02-06 PROCEDURE — 87389 HIV-1 AG W/HIV-1&-2 AB AG IA: CPT

## 2020-02-06 PROCEDURE — 84439 ASSAY OF FREE THYROXINE: CPT

## 2020-02-06 PROCEDURE — 36415 COLL VENOUS BLD VENIPUNCTURE: CPT

## 2020-02-06 PROCEDURE — 86800 THYROGLOBULIN ANTIBODY: CPT

## 2020-02-07 LAB
HIV 1+2 AB+HIV1 P24 AG SERPL QL IA: NORMAL
THYROGLOB AB SERPL-ACNC: <1 IU/ML (ref 0–0.9)
THYROPEROXIDASE AB SERPL-ACNC: 13 IU/ML (ref 0–34)

## 2020-02-19 ENCOUNTER — OFFICE VISIT (OUTPATIENT)
Dept: OBGYN CLINIC | Facility: HOSPITAL | Age: 45
End: 2020-02-19
Payer: COMMERCIAL

## 2020-02-19 VITALS
SYSTOLIC BLOOD PRESSURE: 115 MMHG | WEIGHT: 212 LBS | HEART RATE: 60 BPM | DIASTOLIC BLOOD PRESSURE: 73 MMHG | HEIGHT: 74 IN | BODY MASS INDEX: 27.21 KG/M2

## 2020-02-19 DIAGNOSIS — M18.12 PRIMARY OSTEOARTHRITIS OF FIRST CARPOMETACARPAL JOINT OF LEFT HAND: Primary | ICD-10-CM

## 2020-02-19 PROCEDURE — 20600 DRAIN/INJ JOINT/BURSA W/O US: CPT | Performed by: ORTHOPAEDIC SURGERY

## 2020-02-19 PROCEDURE — 99213 OFFICE O/P EST LOW 20 MIN: CPT | Performed by: ORTHOPAEDIC SURGERY

## 2020-02-19 PROCEDURE — NC001 PR NO CHARGE: Performed by: ORTHOPAEDIC SURGERY

## 2020-02-19 PROCEDURE — 4004F PT TOBACCO SCREEN RCVD TLK: CPT | Performed by: ORTHOPAEDIC SURGERY

## 2020-02-19 PROCEDURE — 3008F BODY MASS INDEX DOCD: CPT | Performed by: ORTHOPAEDIC SURGERY

## 2020-02-19 RX ORDER — LIDOCAINE HYDROCHLORIDE 10 MG/ML
0.5 INJECTION, SOLUTION EPIDURAL; INFILTRATION; INTRACAUDAL; PERINEURAL
Status: COMPLETED | OUTPATIENT
Start: 2020-02-19 | End: 2020-02-19

## 2020-02-19 RX ORDER — BETAMETHASONE SODIUM PHOSPHATE AND BETAMETHASONE ACETATE 3; 3 MG/ML; MG/ML
6 INJECTION, SUSPENSION INTRA-ARTICULAR; INTRALESIONAL; INTRAMUSCULAR; SOFT TISSUE
Status: COMPLETED | OUTPATIENT
Start: 2020-02-19 | End: 2020-02-19

## 2020-02-19 RX ADMIN — BETAMETHASONE SODIUM PHOSPHATE AND BETAMETHASONE ACETATE 6 MG: 3; 3 INJECTION, SUSPENSION INTRA-ARTICULAR; INTRALESIONAL; INTRAMUSCULAR; SOFT TISSUE at 09:38

## 2020-02-19 RX ADMIN — LIDOCAINE HYDROCHLORIDE 0.5 ML: 10 INJECTION, SOLUTION EPIDURAL; INFILTRATION; INTRACAUDAL; PERINEURAL at 09:38

## 2020-02-19 NOTE — PROGRESS NOTES
ASSESSMENT/PLAN:    Assessment:   Left CMC OA    Plan:   Resume activities as tolerated    Follow Up:  3  month(s)    To Do Next Visit:   recheck current issue    General Discussions:     [facility] Arthritis: The anatomy and physiology of carpometacarpal joint arthritis was discussed with the patient today in the office  Deterioration of the articular cartilage eventually leads to hypermobility at the thumb [facility] joint, resulting in joint subluxation, osteophyte formation, cystic changes within the trapezium and base of the first metacarpal, as well as subchondral sclerosis  Eventually, pain, limited mobility, and compensatory hyperextension at the metacarpophalangeal joint may develop  While normal activity and usage of the thumb joint may provide a painful experience to the patient, this typically does not result in damage to the thumb or hand  Treatment options include resting thumb spica splints to decreased joint edema, pain, and inflammation  Therapy exercises to strengthen the thenar musculature may relieve pain, but do not alter the overall continued development of osteoarthritis  Oral medications, topical medications, corticosteroid injections may decrease pain and increase overall function  Eventually, approximately 5% of patients may require surgical intervention  _____________________________________________________  CHIEF COMPLAINT:  Chief Complaint   Patient presents with    Left Thumb - Follow-up         SUBJECTIVE:  Di Nolasco is a 38y o  year old male who presents for follow up regarding Thumb CMC Arthritis  left  Since last visit, Di Nolasco has tried steroid injections with only partial relief  Today there is Pain  Moderate  Intermittant  Dull and Aching to the left CMC joint  Radiation: None    PAST MEDICAL HISTORY:  History reviewed  No pertinent past medical history      PAST SURGICAL HISTORY:  Past Surgical History:   Procedure Laterality Date    CONDYLOMA EXCISION/FULGURATION      HERNIA REPAIR Left 2016    WISDOM TOOTH EXTRACTION  2014       FAMILY HISTORY:  Family History   Problem Relation Age of Onset    No Known Problems Mother     Diabetes Paternal Grandfather         mellitus     Diabetes Paternal Uncle         mellitus        SOCIAL HISTORY:  Social History     Tobacco Use    Smoking status: Current Every Day Smoker     Packs/day: 0 25    Smokeless tobacco: Never Used    Tobacco comment: 3 packs per week   Substance Use Topics    Alcohol use: Yes     Comment: drinks 6 drinks per week    Drug use: Yes     Frequency: 3 0 times per week     Types: Marijuana     Comment: denies IVDA       MEDICATIONS:    Current Outpatient Medications:     diclofenac sodium (VOLTAREN) 1 %, Apply 2 g topically 4 (four) times a day, Disp: 100 g, Rfl: 0    loratadine (CLARITIN) 10 mg tablet, Take 10 mg by mouth, Disp: , Rfl:     meloxicam (MOBIC) 7 5 mg tablet, Take 1 tablet (7 5 mg total) by mouth daily, Disp: 30 tablet, Rfl: 2    Multiple Vitamin (MULTIVITAMINS PO), Take by mouth, Disp: , Rfl:     Omega-3 Fatty Acids (FISH OIL) 645 MG CAPS, Take by mouth, Disp: , Rfl:     Sulfamethoxazole Micro POWD, , Disp: , Rfl:     ALLERGIES:  No Known Allergies    REVIEW OF SYSTEMS:  Pertinent items are noted in HPI  A comprehensive review of systems was negative      LABS:  HgA1c:   Lab Results   Component Value Date    HGBA1C 5 4 09/05/2019     BMP: No results found for: GLUCOSE, CALCIUM, NA, K, CO2, CL, BUN, CREATININE        _____________________________________________________    PHYSICAL EXAMINATION:  /73   Pulse 60   Ht 6' 2" (1 88 m)   Wt 96 2 kg (212 lb)   BMI 27 22 kg/m²   General: well developed and well nourished, alert, oriented times 3 and appears comfortable  Psychiatric: Normal  HEENT: Trachea Midline, No torticollis  Cardiovascular: No discernable arrhythmia  Pulmonary: No wheezing or stridor  Skin: No masses, erythema, lacerations, fluctation, ulcerations  Neurovascular: Sensation Intact to the Median, Ulnar, Radial Nerve, Motor Intact to the Median, Ulnar, Radial Nerve and Pulses Intact    MUSCULOSKELETAL EXAMINATION:    left CMC Exam:  No hyperextension deformity of MCP joint  Positive localized tenderness CMC joint  Positive circumduction   Grind test is Positive for pain and Positive for crepitus  Positive shoulder sign  Flexion and extension intact   No triggering or tenderness over the A1 pulley      _____________________________________________________  STUDIES REVIEWED:  No Studies to review      PROCEDURES PERFORMED:  Small joint arthrocentesis: L thumb CMC  Date/Time: 2/19/2020 9:38 AM  Consent given by: patient  Site marked: site marked  Timeout: Immediately prior to procedure a time out was called to verify the correct patient, procedure, equipment, support staff and site/side marked as required   Supporting Documentation  Indications: pain   Procedure Details  Location: thumb - L thumb CMC  Preparation: Patient was prepped and draped in the usual sterile fashion  Needle size: 25 G  Ultrasound guidance: no  Approach: radial  Medications administered: 6 mg betamethasone acetate-betamethasone sodium phosphate 6 (3-3) mg/mL; 0 5 mL lidocaine (PF) 1 %    Patient tolerance: patient tolerated the procedure well with no immediate complications  Dressing:  Sterile dressing applied               Scribe Attestation    I,:   Jose Ramon Kevin am acting as a scribe while in the presence of the attending physician :        I,:   Clarissa Monroy MD personally performed the services described in this documentation    as scribed in my presence :

## 2020-03-04 DIAGNOSIS — M19.049 CMC ARTHRITIS: ICD-10-CM

## 2020-04-07 ENCOUNTER — TELEMEDICINE (OUTPATIENT)
Dept: INTERNAL MEDICINE CLINIC | Facility: CLINIC | Age: 45
End: 2020-04-07
Payer: COMMERCIAL

## 2020-04-07 DIAGNOSIS — R94.6 ABNORMAL THYROID FUNCTION TEST: Primary | ICD-10-CM

## 2020-04-07 PROCEDURE — G2012 BRIEF CHECK IN BY MD/QHP: HCPCS | Performed by: INTERNAL MEDICINE

## 2020-04-15 DIAGNOSIS — M19.049 CMC ARTHRITIS: ICD-10-CM

## 2020-05-19 DIAGNOSIS — M19.049 CMC ARTHRITIS: ICD-10-CM

## 2020-06-24 ENCOUNTER — OFFICE VISIT (OUTPATIENT)
Dept: OBGYN CLINIC | Facility: HOSPITAL | Age: 45
End: 2020-06-24
Payer: COMMERCIAL

## 2020-06-24 VITALS
BODY MASS INDEX: 25.03 KG/M2 | DIASTOLIC BLOOD PRESSURE: 70 MMHG | HEART RATE: 73 BPM | HEIGHT: 74 IN | WEIGHT: 195 LBS | SYSTOLIC BLOOD PRESSURE: 119 MMHG

## 2020-06-24 DIAGNOSIS — M19.032 CMC DJD(CARPOMETACARPAL DEGENERATIVE JOINT DISEASE), LOCALIZED PRIMARY, LEFT: Primary | ICD-10-CM

## 2020-06-24 PROCEDURE — 20600 DRAIN/INJ JOINT/BURSA W/O US: CPT | Performed by: ORTHOPAEDIC SURGERY

## 2020-06-24 PROCEDURE — 99213 OFFICE O/P EST LOW 20 MIN: CPT | Performed by: ORTHOPAEDIC SURGERY

## 2020-06-24 PROCEDURE — 3008F BODY MASS INDEX DOCD: CPT | Performed by: ORTHOPAEDIC SURGERY

## 2020-06-24 PROCEDURE — 4004F PT TOBACCO SCREEN RCVD TLK: CPT | Performed by: ORTHOPAEDIC SURGERY

## 2020-06-24 RX ORDER — BETAMETHASONE SODIUM PHOSPHATE AND BETAMETHASONE ACETATE 3; 3 MG/ML; MG/ML
3 INJECTION, SUSPENSION INTRA-ARTICULAR; INTRALESIONAL; INTRAMUSCULAR; SOFT TISSUE
Status: COMPLETED | OUTPATIENT
Start: 2020-06-24 | End: 2020-06-24

## 2020-06-24 RX ORDER — LIDOCAINE HYDROCHLORIDE 10 MG/ML
0.5 INJECTION, SOLUTION INFILTRATION; PERINEURAL
Status: COMPLETED | OUTPATIENT
Start: 2020-06-24 | End: 2020-06-24

## 2020-06-24 RX ADMIN — LIDOCAINE HYDROCHLORIDE 0.5 ML: 10 INJECTION, SOLUTION INFILTRATION; PERINEURAL at 15:42

## 2020-06-24 RX ADMIN — BETAMETHASONE SODIUM PHOSPHATE AND BETAMETHASONE ACETATE 3 MG: 3; 3 INJECTION, SUSPENSION INTRA-ARTICULAR; INTRALESIONAL; INTRAMUSCULAR; SOFT TISSUE at 15:42

## 2020-07-28 ENCOUNTER — OFFICE VISIT (OUTPATIENT)
Dept: INTERNAL MEDICINE CLINIC | Facility: CLINIC | Age: 45
End: 2020-07-28
Payer: COMMERCIAL

## 2020-07-28 VITALS
WEIGHT: 200 LBS | DIASTOLIC BLOOD PRESSURE: 60 MMHG | BODY MASS INDEX: 25.67 KG/M2 | TEMPERATURE: 97.9 F | HEIGHT: 74 IN | SYSTOLIC BLOOD PRESSURE: 112 MMHG | RESPIRATION RATE: 16 BRPM | OXYGEN SATURATION: 98 % | HEART RATE: 60 BPM

## 2020-07-28 DIAGNOSIS — Z12.12 SCREENING FOR COLORECTAL CANCER: ICD-10-CM

## 2020-07-28 DIAGNOSIS — Z12.11 SCREENING FOR COLORECTAL CANCER: ICD-10-CM

## 2020-07-28 DIAGNOSIS — R94.6 ABNORMAL THYROID FUNCTION TEST: ICD-10-CM

## 2020-07-28 DIAGNOSIS — Z00.00 ANNUAL PHYSICAL EXAM: Primary | ICD-10-CM

## 2020-07-28 PROBLEM — L72.9 CYST OF BUTTOCKS: Status: RESOLVED | Noted: 2020-02-05 | Resolved: 2020-07-28

## 2020-07-28 PROBLEM — R10.32 LEFT GROIN PAIN: Status: RESOLVED | Noted: 2018-04-05 | Resolved: 2020-07-28

## 2020-07-28 PROBLEM — L02.32 BOIL OF BUTTOCK: Status: RESOLVED | Noted: 2019-12-06 | Resolved: 2020-07-28

## 2020-07-28 PROBLEM — Z13.6 SCREENING FOR CARDIOVASCULAR CONDITION: Status: RESOLVED | Noted: 2018-03-28 | Resolved: 2020-07-28

## 2020-07-28 PROCEDURE — 3008F BODY MASS INDEX DOCD: CPT | Performed by: INTERNAL MEDICINE

## 2020-07-28 PROCEDURE — 99396 PREV VISIT EST AGE 40-64: CPT | Performed by: INTERNAL MEDICINE

## 2020-07-28 NOTE — PATIENT INSTRUCTIONS
Wellness Visit for Adults   AMBULATORY CARE:   A wellness visit  is when you see your healthcare provider to get screened for health problems  You can also get advice on how to stay healthy  Write down your questions so you remember to ask them  Ask your healthcare provider how often you should have a wellness visit  What happens at a wellness visit:  Your healthcare provider will ask about your health, and your family history of health problems  This includes high blood pressure, heart disease, and cancer  He or she will ask if you have symptoms that concern you, if you smoke, and about your mood  You may also be asked about your intake of medicines, supplements, food, and alcohol  Any of the following may be done:  · Your weight  will be checked  Your height may also be checked so your body mass index (BMI) can be calculated  Your BMI shows if you are at a healthy weight  · Your blood pressure  and heart rate will be checked  Your temperature may also be checked  · Blood and urine tests  may be done  Blood tests may be done to check your cholesterol levels  Abnormal cholesterol levels increase your risk for heart disease and stroke  You may also need a blood or urine test to check for diabetes if you are at increased risk  Urine tests may be done to look for signs of an infection or kidney disease  · A physical exam  includes checking your heartbeat and lungs with a stethoscope  Your healthcare provider may also check your skin to look for sun damage  · Screening tests  may be recommended  A screening test is done to check for diseases that may not cause symptoms  The screening tests you may need depend on your age, gender, family history, and lifestyle habits  For example, colorectal screening may be recommended if you are 48years old or older  Screening tests you need if you are a woman:   · A Pap smear  is used to screen for cervical cancer   Pap smears are usually done every 3 to 5 years depending on your age  You may need them more often if you have had abnormal Pap smear test results in the past  Ask your healthcare provider how often you should have a Pap smear  · A mammogram  is an x-ray of your breasts to screen for breast cancer  Experts recommend mammograms every 2 years starting at age 48 years  You may need a mammogram at age 52 years or younger if you have an increased risk for breast cancer  Talk to your healthcare provider about when you should start having mammograms and how often you need them  Vaccines you may need:   · Get an influenza vaccine  every year  The influenza vaccine protects you from the flu  Several types of viruses cause the flu  The viruses change over time, so new vaccines are made each year  · Get a tetanus-diphtheria (Td) booster vaccine  every 10 years  This vaccine protects you against tetanus and diphtheria  Tetanus is a severe infection that may cause painful muscle spasms and lockjaw  Diphtheria is a severe bacterial infection that causes a thick covering in the back of your mouth and throat  · Get a human papillomavirus (HPV) vaccine  if you are female and aged 23 to 32 or male 23 to 24 and never received it  This vaccine protects you from HPV infection  HPV is the most common infection spread by sexual contact  HPV may also cause vaginal, penile, and anal cancers  · Get a pneumococcal vaccine  if you are aged 72 years or older  The pneumococcal vaccine is an injection given to protect you from pneumococcal disease  Pneumococcal disease is an infection caused by pneumococcal bacteria  The infection may cause pneumonia, meningitis, or an ear infection  · Get a shingles vaccine  if you are aged 61 or older, even if you have had shingles before  The shingles vaccine is an injection to protect you from the varicella-zoster virus  This is the same virus that causes chickenpox   Shingles is a painful rash that develops in people who had chickenpox or have been exposed to the virus  How to eat healthy:  My Plate is a model for planning healthy meals  It shows the types and amounts of foods that should go on your plate  Fruits and vegetables make up about half of your plate, and grains and protein make up the other half  A serving of dairy is included on the side of your plate  The amount of calories and serving sizes you need depends on your age, gender, weight, and height  Examples of healthy foods are listed below:  · Eat a variety of vegetables  such as dark green, red, and orange vegetables  You can also include canned vegetables low in sodium (salt) and frozen vegetables without added butter or sauces  · Eat a variety of fresh fruits , canned fruit in 100% juice, frozen fruit, and dried fruit  · Include whole grains  At least half of the grains you eat should be whole grains  Examples include whole-wheat bread, wheat pasta, brown rice, and whole-grain cereals such as oatmeal     · Eat a variety of protein foods such as seafood (fish and shellfish), lean meat, and poultry without skin (turkey and chicken)  Examples of lean meats include pork leg, shoulder, or tenderloin, and beef round, sirloin, tenderloin, and extra lean ground beef  Other protein foods include eggs and egg substitutes, beans, peas, soy products, nuts, and seeds  · Choose low-fat dairy products such as skim or 1% milk or low-fat yogurt, cheese, and cottage cheese  · Limit unhealthy fats  such as butter, hard margarine, and shortening  Exercise:  Exercise at least 30 minutes per day on most days of the week  Some examples of exercise include walking, biking, dancing, and swimming  You can also fit in more physical activity by taking the stairs instead of the elevator or parking farther away from stores  Include muscle strengthening activities 2 days each week  Regular exercise provides many health benefits   It helps you manage your weight, and decreases your risk for type 2 diabetes, heart disease, stroke, and high blood pressure  Exercise can also help improve your mood  Ask your healthcare provider about the best exercise plan for you  General health and safety guidelines:   · Do not smoke  Nicotine and other chemicals in cigarettes and cigars can cause lung damage  Ask your healthcare provider for information if you currently smoke and need help to quit  E-cigarettes or smokeless tobacco still contain nicotine  Talk to your healthcare provider before you use these products  · Limit alcohol  A drink of alcohol is 12 ounces of beer, 5 ounces of wine, or 1½ ounces of liquor  · Lose weight, if needed  Being overweight increases your risk of certain health conditions  These include heart disease, high blood pressure, type 2 diabetes, and certain types of cancer  · Protect your skin  Do not sunbathe or use tanning beds  Use sunscreen with a SPF 15 or higher  Apply sunscreen at least 15 minutes before you go outside  Reapply sunscreen every 2 hours  Wear protective clothing, hats, and sunglasses when you are outside  · Drive safely  Always wear your seatbelt  Make sure everyone in your car wears a seatbelt  A seatbelt can save your life if you are in an accident  Do not use your cell phone when you are driving  This could distract you and cause an accident  Pull over if you need to make a call or send a text message  · Practice safe sex  Use latex condoms if are sexually active and have more than one partner  Your healthcare provider may recommend screening tests for sexually transmitted infections (STIs)  · Wear helmets, lifejackets, and protective gear  Always wear a helmet when you ride a bike or motorcycle, go skiing, or play sports that could cause a head injury  Wear protective equipment when you play sports  Wear a lifejacket when you are on a boat or doing water sports    © 2017 [employer] Information is for End User's use only and may not be sold, redistributed or otherwise used for commercial purposes  All illustrations and images included in CareNotes® are the copyrighted property of [employer]  or [employer] Fu[employer]  The above information is an  only  It is not intended as medical advice for individual conditions or treatments  Talk to your doctor, nurse or pharmacist before following any medical regimen to see if it is safe and effective for you  Weight Management   AMBULATORY CARE:   Why it is important to manage your weight:  Being overweight increases your risk of health conditions such as heart disease, high blood pressure, type 2 diabetes, and certain types of cancer  It can also increase your risk for osteoarthritis, sleep apnea, and other respiratory problems  Aim for a slow, steady weight loss  Even a small amount of weight loss can lower your risk of health problems  How to lose weight safely:  A safe and healthy way to lose weight is to eat fewer calories and get regular exercise  You can lose up about 1 pound a week by decreasing the number of calories you eat by 500 calories each day  You can decrease calories by eating smaller portion sizes or by cutting out high-calorie foods  Read labels to find out how many calories are in the foods you eat  You can also burn calories with exercise such as walking, swimming, or biking  You will be more likely to keep weight off if you make these changes part of your lifestyle  Healthy meal plan for weight management:  A healthy meal plan includes a variety of foods, contains fewer calories, and helps you stay healthy  A healthy meal plan includes the following:  · Eat whole-grain foods more often  A healthy meal plan should contain fiber  Fiber is the part of grains, fruits, and vegetables that is not broken down by your body  Whole-grain foods are healthy and provide extra fiber in your diet   Some examples of whole-grain foods are whole-wheat breads and pastas, oatmeal, brown rice, and bulgur  · Eat a variety of vegetables every day  Include dark, leafy greens such as spinach, kale, odell greens, and mustard greens  Eat yellow and orange vegetables such as carrots, sweet potatoes, and winter squash  · Eat a variety of fruits every day  Choose fresh or canned fruit (canned in its own juice or light syrup) instead of juice  Fruit juice has very little or no fiber  · Eat low-fat dairy foods  Drink fat-free (skim) milk or 1% milk  Eat fat-free yogurt and low-fat cottage cheese  Try low-fat cheeses such as mozzarella and other reduced-fat cheeses  · Choose meat and other protein foods that are low in fat  Choose beans or other legumes such as split peas or lentils  Choose fish, skinless poultry (chicken or turkey), or lean cuts of red meat (beef or pork)  Before you cook meat or poultry, cut off any visible fat  · Use less fat and oil  Try baking foods instead of frying them  Add less fat, such as margarine, sour cream, regular salad dressing and mayonnaise to foods  Eat fewer high-fat foods  Some examples of high-fat foods include french fries, doughnuts, ice cream, and cakes  · Eat fewer sweets  Limit foods and drinks that are high in sugar  This includes candy, cookies, regular soda, and sweetened drinks  Ways to decrease calories:   · Eat smaller portions  ¨ Use a small plate with smaller servings  ¨ Do not eat second helpings  ¨ When you eat at a restaurant, ask for a box and place half of your meal in the box before you eat  ¨ Share an entrée with someone else  · Replace high-calorie snacks with healthy, low-calorie snacks  ¨ Choose fresh fruit, vegetables, fat-free rice cakes, or air-popped popcorn instead of potato chips, nuts, or chocolate  ¨ Choose water or calorie-free drinks instead of soda or sweetened drinks  · Eat regular meals  Skipping meals can lead to overeating later in the day   Eat a healthy snack in place of a meal if you do not have time to eat a regular meal      · Do not shop for groceries when you are hungry  You may be more likely to make unhealthy food choices  Take a grocery list of healthy foods and shop after you have eaten  Exercise:  Exercise at least 30 minutes per day on most days of the week  Some examples of exercise include walking, biking, dancing, and swimming  You can also fit in more physical activity by taking the stairs instead of the elevator or parking farther away from stores  Ask your healthcare provider about the best exercise plan for you  Other things to consider as you try to lose weight:   · Be aware of situations that may give you the urge to overeat, such as eating while watching television  Find ways to avoid these situations  For example, read a book, go for a walk, or do crafts  · Meet with a weight loss support group or friends who are also trying to lose weight  This may help you stay motivated to continue working on your weight loss goals  © 2017 [employer] Information is for End User's use only and may not be sold, redistributed or otherwise used for commercial purposes  All illustrations and images included in CareNotes® are the copyrighted property of [employer]  or [employer]ji Alfredo  The above information is an  only  It is not intended as medical advice for individual conditions or treatments  Talk to your doctor, nurse or pharmacist before following any medical regimen to see if it is safe and effective for you  Cigarette Smoking and Your Health   AMBULATORY CARE:   Risks to your health if you smoke:  Nicotine and other chemicals found in tobacco damage every cell in your body  Even if you are a light smoker, you have an increased risk for cancer, heart disease, and lung disease  If you are pregnant or have diabetes, smoking increases your risk for complications     Benefits to your health if you stop smoking:   · You decrease respiratory symptoms such as coughing, wheezing, and shortness of breath  · You reduce your risk for cancers of the lung, mouth, throat, kidney, bladder, pancreas, stomach, and cervix  If you already have cancer, you increase the benefits of chemotherapy  You also reduce your risk for cancer returning or a second cancer from developing  · You reduce your risk for heart disease, blood clots, heart attack, and stroke  · You reduce your risk for lung infections, and diseases such as pneumonia, asthma, chronic bronchitis, and emphysema  · Your circulation improves  More oxygen can be delivered to your body  If you have diabetes, you lower your risk for complications, such as kidney, artery, and eye diseases  You also lower your risk for nerve damage  Nerve damage can lead to amputations, poor vision, and blindness  · You improve your body's ability to heal and to fight infections  Benefits to the health of others if you stop smoking:  Tobacco is harmful to nonsmokers who breathe in your secondhand smoke  The following are ways the health of others around you may improve when you stop smoking:  · You lower the risks for lung cancer and heart disease in nonsmoking adults  · If you are pregnant, you lower the risk for miscarriage, early delivery, low birth weight, and stillbirth  You also lower your baby's risk for SIDS, obesity, developmental delay, and neurobehavioral problems, such as ADHD  · If you have children, you lower their risk for ear infections, colds, pneumonia, bronchitis, and asthma  For more information and support to stop smoking:   · Smokefree  gov  Phone: 1- 501 - 200-3320  Web Address: www [employer]  Follow up with your healthcare provider as directed:  Write down your questions so you remember to ask them during your visits     © 2017 [employer] Information is for End User's use only and may not be sold, redistributed or otherwise used for commercial purposes  All illustrations and images included in CareNotes® are the copyrighted property of [employer]  or [employer] Fuentes  The above information is an  only  It is not intended as medical advice for individual conditions or treatments  Talk to your doctor, nurse or pharmacist before following any medical regimen to see if it is safe and effective for you  Cholesterol and Your Health   AMBULATORY CARE:   Cholesterol  is a waxy, fat-like substance  Cholesterol is made by your body, but also comes from certain foods you eat  Your body uses cholesterol to make hormones and new cells  Your body also uses cholesterol to protect nerves  Cholesterol comes from foods such as meat and dairy products  Your total cholesterol level is made up by LDL cholesterol, HDL cholesterol, and triglycerides:  · LDL cholesterol  is called bad cholesterol  because it forms plaque in your arteries  As plaque builds up, your arteries become narrow, and less blood flows through  When plaque decreases blood flow to your heart, you may have chest pain  If plaque completely blocks an artery that bring blood to your heart, you may have a heart attack  Plaque can break off and form blood clots  Blood clots may block arteries in your brain and cause a stroke  · HDL cholesterol  is called good cholesterol  because it helps remove LDL cholesterol from your arteries  It does this by attaching to LDL cholesterol and carrying it to your liver  Your liver breaks down LDL cholesterol so your body can get rid of it  High levels of HDL cholesterol can help prevent a heart attack and stroke  Low levels of HDL cholesterol can increase your risk for heart disease, heart attack, and stroke  · Triglycerides  are a type of fat that store energy from foods you eat  High levels of triglycerides also cause plaque buildup  This can increase your risk for a heart attack or stroke   If your triglyceride level is high, your LDL cholesterol level may also be high  How food affects your cholesterol levels:   · Unhealthy fats  increase LDL cholesterol and triglyceride levels in your blood  They are found in foods high in cholesterol, saturated fat, and trans fat:     ¨ Cholesterol  is found in eggs, dairy, and meat  ¨ Saturated fat  is found in butter, cheese, ice cream, whole milk, and coconut oil  Saturated fat is also found in meat, such as sausage, hot dogs, and bologna  ¨ Trans fat  is found in liquid oils and is used in fried and baked foods  Foods that contain trans fats include chips, crackers, muffins, sweet rolls, microwave popcorn, and cookies  · Healthy fats,  also called unsaturated fats, help lower LDL cholesterol and triglyceride levels  Healthy fats include the following:     ¨ Monounsaturated fats  are found in foods such as olive oil, canola oil, avocado, nuts, and olives  ¨ Polyunsaturated fats,  such as omega 3 fats, are found in fish, such as salmon, trout, and tuna  They can also be found in plant foods such as flaxseed, walnuts, and soybeans  Other things that affect your cholesterol levels:   · Smoking cigarettes    · Being overweight or obese     · Drinking large amounts of alcohol    · Not enough exercise or no exercise    · Certain genes passed from your parents to you  What you need to know about having your cholesterol levels checked: Adults 21to 39years of age should have their cholesterol levels checked every 4 to 6 years  Adults 45 years and older should have their cholesterol checked every 1 to 2 years  You may need your cholesterol checked more often, or at a younger age, if you have risk factors for heart disease  You may also need to have your cholesterol checked more often if you have other health conditions, such as diabetes  Blood tests are used to check cholesterol levels   Blood tests measure your levels of triglycerides, LDL cholesterol, and HDL cholesterol  Cholesterol level goals: Your cholesterol level goal may depend on your risk for heart disease  It may also depend on your age and other health conditions  Ask your healthcare provider if the following goals are right for you:  · Your total cholesterol level  should be less than 200 mg/dL  This number may also depend on your HDL and LDL cholesterol goals  · Your LDL cholesterol level  should be less than 130 mg/dL  · Your HDL cholesterol level  should be 60 mg/dL or higher  · Your triglyceride level  should be less than 150 mg/dL  Treatment for high cholesterol:  Treatment for high cholesterol will also decrease your risk of heart disease, heart attack, and stroke  Treatment may include any of the following:  · Medicines  may be given to lower your LDL cholesterol, triglyceride levels, or total cholesterol level  You may need medicines to lower your cholesterol if any of the following is true:     ¨ You have a history of stroke, TIA, unstable angina, or a heart attack    ¨ Your LDL cholesterol level is 190 mg/dL or higher    ¨ You are age 36to [address] of age, have diabetes, and your LDL cholesterol is 70 mg/dL or higher    ¨ You are age 36to [address] of age, have risk factors for heart disease, and your LDL cholesterol is 70 mg/dL or higher    · Lifestyle changes  include changes to your diet, exercise, weight loss, and quitting smoking  It also includes decreasing the amount of alcohol you drink  · Supplements  include fish oil, red yeast rice, and garlic  Fish oil may help lower your triglyceride and LDL cholesterol levels  It may also increase your HDL cholesterol level  Red yeast rice may help decrease your total cholesterol level and LDL cholesterol level  Garlic may help lower your total cholesterol level  Do not take these supplements without talking to your healthcare provider     Nutrition to help lower your cholesterol levels:  A registered dietitian can help you create a healthy eating plan  Read food labels and choose foods low in saturated fat, trans fats, and cholesterol  · Decrease the total amount of fat you eat  Choose lean meats, fat-free or 1% fat milk, and low-fat dairy products, such as yogurt and cheese  Try to limit or avoid red meats  Limit or do not eat fried foods or baked goods such as cookies  · Replace unhealthy fats with healthy fats  Cook foods in olive oil or canola oil  Choose soft margarines that are low in saturated fat and trans fat  Seeds, nuts, and avocados are other examples of healthy fats  · Eat foods with omega-3 fats  Examples include salmon, tuna, mackerel, walnuts, and flaxseed  Eat fish 2 times per week  Children and pregnant women should not eat fish that have high levels of mercury, such as shark, swordfish, and inocencia mackerel  · Increase the amount of plant-based foods you eat  Plant-based foods are low in cholesterol and fat  Eating more of these foods may help lower your cholesterol and help you lose weight  Examples of plant-based foods includes fruits, vegetables, legumes, and whole grains  Replace milk that contains dairy with almond, soy, or coconut milk  Eat beans and foods with soy for protein instead of meat  Ask your healthcare provider or dietitian for more information on plant-based foods  · Increase the amount of fiber you eat  High-fiber foods can help lower your LDL cholesterol  You should eat between 20 and 30 grams of fiber each day  Eat at least 5 servings of fruits and vegetables each day  Other examples of high-fiber foods include whole-grain or whole-wheat breads, pastas, or cereals, and brown rice  Eat 3 ounces of whole-grain foods each day  Increase fiber slowly  You may have abdominal discomfort, bloating, and gas if you add fiber to your diet too quickly  Lifestyle changes you can make to help lower your cholesterol levels:   · Maintain a healthy weight    Ask your healthcare provider how much you should weigh  Ask him or her to help you create a weight loss plan if you are overweight  Weight loss can decrease your total cholesterol and triglyceride levels  · Exercise regularly  Exercise can help lower your total cholesterol level and maintain a healthy weight  Exercise can also help increase your HDL cholesterol level  Work with your healthcare provider to create an exercise program that is right for you  Get at least 30 minutes of moderate exercise most days of the week  Examples of exercise include brisk walking, swimming, or biking  · Do not smoke  Nicotine and other chemicals in cigarettes and cigars can damage your lungs, heart, and blood vessels  They can also raise your triglyceride levels  Ask your healthcare provider for information if you currently smoke and need help to quit  E-cigarettes or smokeless tobacco still contain nicotine  Talk to your healthcare provider before you use these products  · Limit or do not drink alcohol  Alcohol can increase your triglyceride levels  Ask your healthcare provider if it is safe for you to drink alcohol  Also ask how much is safe for you to drink each day  © 2017 [address] [facility] Information is for End User's use only and may not be sold, redistributed or otherwise used for commercial purposes  All illustrations and images included in CareNotes® are the copyrighted property of A D A M , Inc  or [employer]  The above information is an  only  It is not intended as medical advice for individual conditions or treatments  Talk to your doctor, nurse or pharmacist before following any medical regimen to see if it is safe and effective for you

## 2020-07-28 NOTE — PROGRESS NOTES
[address] INTERNAL MEDICINE    NAME: Caleb Packer  AGE: 54 y o  SEX: male  : 1975     DATE: 2020     Assessment and Plan:     Problem List Items Addressed This Visit        Other    Abnormal thyroid function test    Relevant Orders    TSH, 3rd generation with Free T4 reflex      Other Visit Diagnoses     Annual physical exam    -  Primary    Screening for colorectal cancer        Relevant Orders    Ambulatory referral to Gastroenterology          Immunizations and preventive care screenings were discussed with patient today  Appropriate education was printed on patient's after visit summary  Counseling:  Alcohol/drug use: discussed moderation in alcohol intake, the recommendations for healthy alcohol use, and avoidance of illicit drug use  Dental Health: discussed importance of regular tooth brushing, flossing, and dental visits  · Exercise: the importance of regular exercise/physical activity was discussed  Recommend exercise 3-5 times per week for at least 30 minutes  · Immunizations discussed  BMI Counseling: Body mass index is 25 68 kg/m²  The BMI is above normal  Nutrition recommendations include decreasing portion sizes, consuming healthier snacks, limiting drinks that contain sugar, moderation in carbohydrate intake and reducing intake of cholesterol  Exercise recommendations include moderate physical activity 150 minutes/week and exercising 3-5 times per week  No pharmacotherapy was ordered  Tobacco Cessation Counseling: Tobacco cessation counseling was provided  The patient is sincerely urged to quit consumption of tobacco  He is not ready to quit tobacco  Medication options and side effects of medication discussed  Patient refused medication   Smokes 1/2 ppd      Return in about 1 year (around 2021) for Annual physical      Chief Complaint:     Chief Complaint   Patient presents with    Physical Exam History of Present Illness:     Adult Annual Physical   Patient with abn TSH, L CMC joint arthritis here for a comprehensive physical exam  The patient reports no problems  Diet and Physical Activity  · Diet/Nutrition: regular diet  · Exercise: running four times per week     Depression Screening  PHQ-9 Depression Screening    PHQ-9:    Frequency of the following problems over the past two weeks:       Little interest or pleasure in doing things:  0 - not at all  Feeling down, depressed, or hopeless:  0 - not at all  PHQ-2 Score:  0       General Health  · Sleep: sleeps well and gets 7-8 hours of sleep on average  · Hearing: normal - bilateral   · Vision: no vision problems  · Dental: regular dental visits   Health  · Symptoms include: none     Review of Systems:     Review of Systems   Constitutional: Positive for activity change and unexpected weight change  Negative for appetite change and fatigue  HENT: Positive for congestion, postnasal drip, rhinorrhea and sneezing  Eyes: Negative for visual disturbance  Respiratory: Negative for cough, shortness of breath, wheezing and stridor  Cardiovascular: Negative for chest pain, palpitations and leg swelling  Gastrointestinal: Negative for abdominal pain, constipation, diarrhea, nausea and vomiting  Rare heartburn   Genitourinary: Negative for difficulty urinating  Musculoskeletal: Positive for arthralgias (L thumb, R shoulder) and myalgias  Negative for back pain and joint swelling  Neurological: Negative for dizziness, weakness, numbness and headaches  Psychiatric/Behavioral: Negative for decreased concentration, dysphoric mood and sleep disturbance  Past Medical History:     History reviewed  No pertinent past medical history     Past Surgical History:     Past Surgical History:   Procedure Laterality Date    CONDYLOMA EXCISION/FULGURATION      HERNIA REPAIR Left 2016    WISDOM TOOTH EXTRACTION  2014      Family History:     Family History   Problem Relation Age of Onset    No Known Problems Mother     Diabetes Paternal Grandfather         mellitus     Diabetes Paternal Uncle         mellitus       Social History:        Social History     Socioeconomic History    Marital status: Single     Spouse name: None    Number of children: None    Years of education: None    Highest education level: None   Occupational History    Occupation: Cook    Social Needs    Financial resource strain: None    Food insecurity:     Worry: None     Inability: None    Transportation needs:     Medical: None     Non-medical: None   Tobacco Use    Smoking status: Current Every Day Smoker     Packs/day: 0 25    Smokeless tobacco: Never Used    Tobacco comment: 3 packs per week   Substance and Sexual Activity    Alcohol use: Yes     Comment: drinks 6 drinks per week    Drug use: Yes     Frequency: 3 0 times per week     Types: Marijuana     Comment: denies IVDA    Sexual activity: Yes     Partners: Female   Lifestyle    Physical activity:     Days per week: None     Minutes per session: None    Stress: None   Relationships    Social connections:     Talks on phone: None     Gets together: None     Attends Alevism service: None     Active member of club or organization: None     Attends meetings of clubs or organizations: None     Relationship status: None    Intimate partner violence:     Fear of current or ex partner: None     Emotionally abused: None     Physically abused: None     Forced sexual activity: None   Other Topics Concern    None   Social History Narrative          Current Medications:     Current Outpatient Medications   Medication Sig Dispense Refill    diclofenac sodium (VOLTAREN) 1 % Apply 2 g topically 4 (four) times a day 100 g 0    loratadine (CLARITIN) 10 mg tablet Take 10 mg by mouth      meloxicam (MOBIC) 7 5 mg tablet Take 1 tablet (7 5 mg total) by mouth daily 30 tablet 2    Multiple Vitamin (MULTIVITAMINS PO) Take by mouth      Omega-3 Fatty Acids (FISH OIL) 645 MG CAPS Take by mouth      Sulfamethoxazole Micro POWD        No current facility-administered medications for this visit  Allergies:     No Known Allergies   Physical Exam:     /60 (BP Location: Left arm, Patient Position: Sitting)   Pulse 60   Temp 97 9 °F (36 6 °C)   Resp 16   Ht 6' 2" (1 88 m)   Wt 90 7 kg (200 lb)   SpO2 98%   BMI 25 68 kg/m²     Physical Exam   Constitutional: He appears well-developed and well-nourished  No distress  HENT:   Head: Normocephalic  Right Ear: External ear normal    Left Ear: External ear normal    Nose: Nose normal    Mouth/Throat: Oropharynx is clear and moist  No oropharyngeal exudate  Eyes: Conjunctivae and EOM are normal    Neck: Neck supple  No thyromegaly present  Cardiovascular: Normal rate, regular rhythm, normal heart sounds and intact distal pulses  Pulmonary/Chest: Effort normal and breath sounds normal  No stridor  No respiratory distress  Abdominal: Soft  Bowel sounds are normal  He exhibits no distension  There is no tenderness  Musculoskeletal: Normal range of motion  L CMC joint hypertrophy   Lymphadenopathy:     He has no cervical adenopathy  Neurological: He is alert  Skin: He is not diaphoretic  Psychiatric: His speech is normal and behavior is normal  His mood appears not anxious  He does not exhibit a depressed mood  He is attentive  Vitals reviewed        Adelfo Brownlee DO  [facility] INTERNAL MEDICINE

## 2020-11-11 ENCOUNTER — OFFICE VISIT (OUTPATIENT)
Dept: OBGYN CLINIC | Facility: HOSPITAL | Age: 45
End: 2020-11-11
Payer: COMMERCIAL

## 2020-11-11 VITALS
HEART RATE: 58 BPM | DIASTOLIC BLOOD PRESSURE: 69 MMHG | HEIGHT: 74 IN | SYSTOLIC BLOOD PRESSURE: 114 MMHG | WEIGHT: 208 LBS | BODY MASS INDEX: 26.69 KG/M2

## 2020-11-11 DIAGNOSIS — M19.032 CMC DJD(CARPOMETACARPAL DEGENERATIVE JOINT DISEASE), LOCALIZED PRIMARY, LEFT: Primary | ICD-10-CM

## 2020-11-11 DIAGNOSIS — M18.12 PRIMARY OSTEOARTHRITIS OF FIRST CARPOMETACARPAL JOINT OF LEFT HAND: ICD-10-CM

## 2020-11-11 PROCEDURE — 20600 DRAIN/INJ JOINT/BURSA W/O US: CPT | Performed by: ORTHOPAEDIC SURGERY

## 2020-11-11 PROCEDURE — 99213 OFFICE O/P EST LOW 20 MIN: CPT | Performed by: ORTHOPAEDIC SURGERY

## 2020-11-11 PROCEDURE — 4004F PT TOBACCO SCREEN RCVD TLK: CPT | Performed by: ORTHOPAEDIC SURGERY

## 2020-11-11 PROCEDURE — 3008F BODY MASS INDEX DOCD: CPT | Performed by: ORTHOPAEDIC SURGERY

## 2020-11-11 RX ORDER — MELOXICAM 7.5 MG/1
7.5 TABLET ORAL DAILY
Qty: 30 TABLET | Refills: 2 | Status: SHIPPED | OUTPATIENT
Start: 2020-11-11 | End: 2022-01-04

## 2020-11-11 RX ORDER — LIDOCAINE HYDROCHLORIDE 10 MG/ML
0.5 INJECTION, SOLUTION EPIDURAL; INFILTRATION; INTRACAUDAL; PERINEURAL
Status: COMPLETED | OUTPATIENT
Start: 2020-11-11 | End: 2020-11-11

## 2020-11-11 RX ORDER — BETAMETHASONE SODIUM PHOSPHATE AND BETAMETHASONE ACETATE 3; 3 MG/ML; MG/ML
6 INJECTION, SUSPENSION INTRA-ARTICULAR; INTRALESIONAL; INTRAMUSCULAR; SOFT TISSUE
Status: COMPLETED | OUTPATIENT
Start: 2020-11-11 | End: 2020-11-11

## 2020-11-11 RX ADMIN — BETAMETHASONE SODIUM PHOSPHATE AND BETAMETHASONE ACETATE 6 MG: 3; 3 INJECTION, SUSPENSION INTRA-ARTICULAR; INTRALESIONAL; INTRAMUSCULAR; SOFT TISSUE at 15:45

## 2020-11-11 RX ADMIN — LIDOCAINE HYDROCHLORIDE 0.5 ML: 10 INJECTION, SOLUTION EPIDURAL; INFILTRATION; INTRACAUDAL; PERINEURAL at 15:45

## 2021-03-24 ENCOUNTER — OFFICE VISIT (OUTPATIENT)
Dept: OBGYN CLINIC | Facility: HOSPITAL | Age: 46
End: 2021-03-24
Payer: COMMERCIAL

## 2021-03-24 VITALS
HEIGHT: 74 IN | SYSTOLIC BLOOD PRESSURE: 112 MMHG | WEIGHT: 203.6 LBS | BODY MASS INDEX: 26.13 KG/M2 | HEART RATE: 69 BPM | DIASTOLIC BLOOD PRESSURE: 66 MMHG

## 2021-03-24 DIAGNOSIS — M18.12 PRIMARY OSTEOARTHRITIS OF FIRST CARPOMETACARPAL JOINT OF LEFT HAND: Primary | ICD-10-CM

## 2021-03-24 PROCEDURE — 3008F BODY MASS INDEX DOCD: CPT | Performed by: ORTHOPAEDIC SURGERY

## 2021-03-24 PROCEDURE — 4004F PT TOBACCO SCREEN RCVD TLK: CPT | Performed by: ORTHOPAEDIC SURGERY

## 2021-03-24 PROCEDURE — 99213 OFFICE O/P EST LOW 20 MIN: CPT | Performed by: ORTHOPAEDIC SURGERY

## 2021-03-24 PROCEDURE — 20600 DRAIN/INJ JOINT/BURSA W/O US: CPT | Performed by: ORTHOPAEDIC SURGERY

## 2021-03-24 RX ORDER — LIDOCAINE HYDROCHLORIDE 10 MG/ML
0.5 INJECTION, SOLUTION INFILTRATION; PERINEURAL
Status: COMPLETED | OUTPATIENT
Start: 2021-03-24 | End: 2021-03-24

## 2021-03-24 RX ORDER — METHYLPREDNISOLONE ACETATE 40 MG/ML
0.05 INJECTION, SUSPENSION INTRA-ARTICULAR; INTRALESIONAL; INTRAMUSCULAR; SOFT TISSUE
Status: COMPLETED | OUTPATIENT
Start: 2021-03-24 | End: 2021-03-24

## 2021-03-24 RX ADMIN — METHYLPREDNISOLONE ACETATE 0.05 ML: 40 INJECTION, SUSPENSION INTRA-ARTICULAR; INTRALESIONAL; INTRAMUSCULAR; SOFT TISSUE at 14:46

## 2021-03-24 RX ADMIN — LIDOCAINE HYDROCHLORIDE 0.5 ML: 10 INJECTION, SOLUTION INFILTRATION; PERINEURAL at 14:46

## 2021-03-24 NOTE — PROGRESS NOTES
ASSESSMENT/PLAN:    Assessment:   Thumb CMC Arthritis  left    Plan:   The patient will proceed with a left thumb CMC jt steroid injection WITH DEPO today  He has no formal restrictions  Follow Up:  3  month(s)    To Do Next Visit:       General Discussions:     CMC Arthritis: The anatomy and physiology of carpometacarpal joint arthritis was discussed with the patient today in the office  Deterioration of the articular cartilage eventually leads to hypermobility at the thumb [facility] joint, resulting in joint subluxation, osteophyte formation, cystic changes within the trapezium and base of the first metacarpal, as well as subchondral sclerosis  Eventually, pain, limited mobility, and compensatory hyperextension at the metacarpophalangeal joint may develop  While normal activity and usage of the thumb joint may provide a painful experience to the patient, this typically does not result in damage to the thumb or hand  Treatment options include resting thumb spica splints to decreased joint edema, pain, and inflammation  Therapy exercises to strengthen the thenar musculature may relieve pain, but do not alter the overall continued development of osteoarthritis  Oral medications, topical medications, corticosteroid injections may decrease pain and increase overall function  Eventually, approximately 5% of patients may require surgical intervention  Operative Discussions:       _____________________________________________________  CHIEF COMPLAINT:  Chief Complaint   Patient presents with    Left Thumb - Follow-up         SUBJECTIVE:  Nilam Samuels is a 54 y o  male who presents for follow up regarding Thumb CMC Arthritis  left  Since last visit, Nilam Samuels has tried steroid injections without relief  Today there is Pain  Severe  Intermittant  Sharp to the left thumb      Radiation: Yes to the  thumb  Associated symptoms: No Complaints  Work status: works as a [employer]     PAST MEDICAL HISTORY:  History reviewed  No pertinent past medical history  PAST SURGICAL HISTORY:  Past Surgical History:   Procedure Laterality Date    CONDYLOMA EXCISION/FULGURATION      HERNIA REPAIR Left 2016    WISDOM TOOTH EXTRACTION  2014       FAMILY HISTORY:  Family History   Problem Relation Age of Onset    No Known Problems Mother     Diabetes Paternal Grandfather         mellitus     Diabetes Paternal Uncle         mellitus        SOCIAL HISTORY:  Social History     Tobacco Use    Smoking status: Current Every Day Smoker     Packs/day: 0 25    Smokeless tobacco: Never Used    Tobacco comment: 3 packs per week   Substance Use Topics    Alcohol use: Yes     Comment: drinks 6 drinks per week    Drug use: Yes     Frequency: 3 0 times per week     Types: Marijuana     Comment: denies IVDA       MEDICATIONS:    Current Outpatient Medications:     diclofenac sodium (VOLTAREN) 1 %, Apply 2 g topically 4 (four) times a day, Disp: 100 g, Rfl: 0    loratadine (CLARITIN) 10 mg tablet, Take 10 mg by mouth, Disp: , Rfl:     meloxicam (MOBIC) 7 5 mg tablet, Take 1 tablet (7 5 mg total) by mouth daily, Disp: 30 tablet, Rfl: 2    Multiple Vitamin (MULTIVITAMINS PO), Take by mouth, Disp: , Rfl:     Omega-3 Fatty Acids (FISH OIL) 645 MG CAPS, Take by mouth, Disp: , Rfl:     Sulfamethoxazole Micro POWD, , Disp: , Rfl:     ALLERGIES:  No Known Allergies    REVIEW OF SYSTEMS:  Pertinent items are noted in HPI      LABS:  HgA1c:   Lab Results   Component Value Date    HGBA1C 5 4 09/05/2019     BMP: No results found for: GLUCOSE, CALCIUM, NA, K, CO2, CL, BUN, CREATININE        _____________________________________________________  PHYSICAL EXAMINATION:  Vital signs: /66   Pulse 69   Ht 6' 2" (1 88 m)   Wt 92 4 kg (203 lb 9 6 oz)   BMI 26 14 kg/m²   General: well developed and well nourished, alert, oriented times 3 and appears comfortable  Psychiatric: Normal  HEENT: Trachea Midline, No torticollis  Cardiovascular: No discernable arrhythmia  Pulmonary: No wheezing or stridor  Skin: No masses, erythema, lacerations, fluctation, ulcerations  Neurovascular: Sensation Intact to the Median, Ulnar, Radial Nerve, Motor Intact to the Median, Ulnar, Radial Nerve and Pulses Intact    MUSCULOSKELETAL EXAMINATION:  LEFT SIDE:  CMC: No Instability, Positive grind, Positive tendnerness CMC and no hyperextension at MP jt and [place]:  Negative finkelstein and Negative trigger thumb    _____________________________________________________  STUDIES REVIEWED:  No Studies to review      PROCEDURES PERFORMED:  Small joint arthrocentesis: L thumb CMC  [place] Protocol:  Consent given by: patient  Site marked: the operative site was marked  Supporting Documentation  Indications: pain   Procedure Details  Location: thumb - L thumb CMC  Medications administered: 0 5 mL lidocaine 1 %; 0 05 mL methylPREDNISolone acetate 40 mg/mL    Patient tolerance: patient tolerated the procedure well with no immediate complications  Dressing:  Sterile dressing applied            Scribe Attestation    I,:  Rhonda Lund am acting as a scribe while in the presence of the attending physician :       I,:  Felicity Cranker, MD personally performed the services described in this documentation    as scribed in my presence :

## 2021-04-13 ENCOUNTER — TELEPHONE (OUTPATIENT)
Dept: UROLOGY | Facility: AMBULATORY SURGERY CENTER | Age: 46
End: 2021-04-13

## 2021-04-13 ENCOUNTER — OFFICE VISIT (OUTPATIENT)
Dept: UROLOGY | Facility: AMBULATORY SURGERY CENTER | Age: 46
End: 2021-04-13
Payer: COMMERCIAL

## 2021-04-13 VITALS
SYSTOLIC BLOOD PRESSURE: 102 MMHG | WEIGHT: 203 LBS | HEART RATE: 62 BPM | DIASTOLIC BLOOD PRESSURE: 60 MMHG | BODY MASS INDEX: 26.05 KG/M2 | HEIGHT: 74 IN

## 2021-04-13 DIAGNOSIS — A63.0 CONDYLOMA ACUMINATA: Primary | ICD-10-CM

## 2021-04-13 LAB
SL AMB  POCT GLUCOSE, UA: ABNORMAL
SL AMB LEUKOCYTE ESTERASE,UA: ABNORMAL
SL AMB POCT BILIRUBIN,UA: ABNORMAL
SL AMB POCT BLOOD,UA: ABNORMAL
SL AMB POCT CLARITY,UA: CLEAR
SL AMB POCT COLOR,UA: YELLOW
SL AMB POCT KETONES,UA: ABNORMAL
SL AMB POCT NITRITE,UA: ABNORMAL
SL AMB POCT PH,UA: 7
SL AMB POCT SPECIFIC GRAVITY,UA: 1.02
SL AMB POCT URINE PROTEIN: ABNORMAL
SL AMB POCT UROBILINOGEN: 0.2

## 2021-04-13 PROCEDURE — 81002 URINALYSIS NONAUTO W/O SCOPE: CPT | Performed by: NURSE PRACTITIONER

## 2021-04-13 PROCEDURE — 3008F BODY MASS INDEX DOCD: CPT | Performed by: NURSE PRACTITIONER

## 2021-04-13 PROCEDURE — 4004F PT TOBACCO SCREEN RCVD TLK: CPT | Performed by: NURSE PRACTITIONER

## 2021-04-13 PROCEDURE — 99214 OFFICE O/P EST MOD 30 MIN: CPT | Performed by: NURSE PRACTITIONER

## 2021-04-13 NOTE — PROGRESS NOTES
4/13/2021      Chief Complaint   Patient presents with    Condyloma     Assessment and Plan    54 y o  male managed by Dr Zaragoza Breath    1  Condyloma  · Recurrence  · Status post excision 07/2018  · prescription for Condylox provided  Discussed application of medication  · will follow-up with patient in 1 month to evaluate medication efficacy  History of Present Illness  Jennifer Tong is a 54 y o  male here for follow up evaluation of    penile condyloma and is status post excision 07/2018  Cytology was negative for malignancy  Surgical states x2 well-healed small area of question noted on the right lateral penile shaft  Patient reports performing frequent skin checks to his penis and scrotum and noticed a small area of concern to the right penile shaft  Denies all lower urinary tract symptoms including dysuria, hematuria, urinary frequency and urgency  He denies significant family history of prostate cancer and is aware to return to the office at the age of 53 to begin routine prostate cancer screening with PSA and CHASE  Review of Systems   Constitutional: Negative for chills and fever  Respiratory: Negative for cough and shortness of breath  Cardiovascular: Negative for chest pain  Gastrointestinal: Negative for abdominal distention, abdominal pain, blood in stool, nausea and vomiting  Genitourinary: Negative for difficulty urinating, dysuria, enuresis, flank pain, frequency, hematuria and urgency  Small right penile lesion   Skin: Negative for rash  AUA SYMPTOM SCORE      Most Recent Value   AUA SYMPTOM SCORE   How often have you had a sensation of not emptying your bladder completely after you finished urinating? 0   How often have you had to urinate again less than two hours after you finished urinating? 1   How often have you found you stopped and started again several times when you urinate?  0   How often have you found it difficult to postpone urination?   0   How often have you had a weak urinary stream?  0   How often have you had to push or strain to begin urination? 0   How many times did you most typically get up to urinate from the time you went to bed at night until the time you got up in the morning? 1   Quality of Life: If you were to spend the rest of your life with your urinary condition just the way it is now, how would you feel about that?  1   AUA SYMPTOM SCORE  2         Past Medical History  History reviewed  No pertinent past medical history      Past Social History  Past Surgical History:   Procedure Laterality Date    CONDYLOMA EXCISION/FULGURATION      HERNIA REPAIR Left 2016    WISDOM TOOTH EXTRACTION  2014     Social History     Tobacco Use   Smoking Status Current Every Day Smoker    Packs/day: 0 25   Smokeless Tobacco Never Used   Tobacco Comment    3 packs per week       Past Family History  Family History   Problem Relation Age of Onset    No Known Problems Mother     Diabetes Paternal Grandfather         mellitus     Diabetes Paternal Uncle         mellitus        Past Social history  Social History     Socioeconomic History    Marital status: Single     Spouse name: Not on file    Number of children: Not on file    Years of education: Not on file    Highest education level: Not on file   Occupational History    Occupation: [employer]    Social Needs    Financial resource strain: Not on file    Food insecurity     Worry: Not on file     Inability: Not on file   [employer] needs     Medical: Not on file     Non-medical: Not on file   Tobacco Use    Smoking status: Current Every Day Smoker     Packs/day: 0 25    Smokeless tobacco: Never Used    Tobacco comment: 3 packs per week   Substance and Sexual Activity    Alcohol use: Yes     Comment: drinks 6 drinks per week    Drug use: Yes     Frequency: 3 0 times per week     Types: Marijuana     Comment: denies IVDA    Sexual activity: Yes     Partners: Female   Lifestyle    Physical activity Days per week: Not on file     Minutes per session: Not on file    Stress: Not on file   Relationships    Social connections     Talks on phone: Not on file     Gets together: Not on file     Attends Mu-ism service: Not on file     Active member of club or organization: Not on file     Attends meetings of clubs or organizations: Not on file     Relationship status: Not on file    Intimate partner violence     Fear of current or ex partner: Not on file     Emotionally abused: Not on file     Physically abused: Not on file     Forced sexual activity: Not on file   Other Topics Concern    Not on file   Social History Narrative           Current Medications  Current Outpatient Medications   Medication Sig Dispense Refill    diclofenac sodium (VOLTAREN) 1 % Apply 2 g topically 4 (four) times a day 100 g 0    loratadine (CLARITIN) 10 mg tablet Take 10 mg by mouth      meloxicam (MOBIC) 7 5 mg tablet Take 1 tablet (7 5 mg total) by mouth daily 30 tablet 2    Multiple Vitamin (MULTIVITAMINS PO) Take by mouth      Omega-3 Fatty Acids (FISH OIL) 645 MG CAPS Take by mouth      podofilox (CONDYLOX) 0 5 % gel Apply topically 2 (two) times a day Apply for 3 days followed by three days of no therapy  Repeat for 4 cycles 3 5 g 0    Sulfamethoxazole Micro POWD        No current facility-administered medications for this visit  Allergies  No Known Allergies      The following portions of the patient's history were reviewed and updated as appropriate: allergies, current medications, past medical history, past social history, past surgical history and problem list       Vitals  Vitals:    04/13/21 1406   BP: 102/60   Pulse: 62   Weight: 92 1 kg (203 lb)   Height: 6' 2" (1 88 m)           Physical Exam  Physical Exam  Vitals signs reviewed  Constitutional:       General: He is not in acute distress  Appearance: Normal appearance  He is normal weight  HENT:      Head: Normocephalic     Eyes:      Pupils: Pupils are equal, round, and reactive to light  Cardiovascular:      Rate and Rhythm: Normal rate  Pulmonary:      Effort: No respiratory distress  Breath sounds: Normal breath sounds  Genitourinary:     Comments:  Small penile condyloma noted to right shaft  Non reddened, raised lesion lesion  No pruritus noted  Skin:     General: Skin is warm and dry  Neurological:      General: No focal deficit present  Mental Status: He is alert and oriented to person, place, and time     Psychiatric:         Mood and Affect: Mood normal          Behavior: Behavior normal        Results  Recent Results (from the past 1 hour(s))   POCT urine dip    Collection Time: 04/13/21  2:14 PM   Result Value Ref Range    LEUKOCYTE ESTERASE,UA neg     NITRITE,UA neg     SL AMB POCT UROBILINOGEN 0 2     POCT URINE PROTEIN neg      PH,UA 7 0     BLOOD,UA trace     SPECIFIC GRAVITY,UA 1 020     KETONES,UA neg     BILIRUBIN,UA neg     GLUCOSE, UA neg      COLOR,UA yellow     CLARITY,UA clear    ]  No results found for: PSA  No results found for: GLUCOSE, CALCIUM, NA, K, CO2, CL, BUN, CREATININE  Lab Results   Component Value Date    WBC 5 58 02/06/2020    HGB 15 4 02/06/2020    HCT 46 5 02/06/2020    MCV 94 02/06/2020     02/06/2020     Orders  Orders Placed This Encounter   Procedures    POCT urine dip       MONIQUE Ruvalcaba

## 2021-04-21 ENCOUNTER — IMMUNIZATIONS (OUTPATIENT)
Dept: FAMILY MEDICINE CLINIC | Facility: HOSPITAL | Age: 46
End: 2021-04-21

## 2021-04-21 DIAGNOSIS — Z23 ENCOUNTER FOR IMMUNIZATION: Primary | ICD-10-CM

## 2021-04-21 PROCEDURE — 0001A SARS-COV-2 / COVID-19 MRNA VACCINE (PFIZER-BIONTECH) 30 MCG: CPT

## 2021-04-21 PROCEDURE — 91300 SARS-COV-2 / COVID-19 MRNA VACCINE (PFIZER-BIONTECH) 30 MCG: CPT

## 2021-05-07 ENCOUNTER — TELEPHONE (OUTPATIENT)
Dept: UROLOGY | Facility: AMBULATORY SURGERY CENTER | Age: 46
End: 2021-05-07

## 2021-05-07 NOTE — TELEPHONE ENCOUNTER
Patient stated that meds given at last OV have worked and he was told he could cancel if he was doing well

## 2021-05-12 ENCOUNTER — IMMUNIZATIONS (OUTPATIENT)
Dept: FAMILY MEDICINE CLINIC | Facility: HOSPITAL | Age: 46
End: 2021-05-12

## 2021-05-12 DIAGNOSIS — Z23 ENCOUNTER FOR IMMUNIZATION: Primary | ICD-10-CM

## 2021-05-12 PROCEDURE — 91300 SARS-COV-2 / COVID-19 MRNA VACCINE (PFIZER-BIONTECH) 30 MCG: CPT

## 2021-05-12 PROCEDURE — 0002A SARS-COV-2 / COVID-19 MRNA VACCINE (PFIZER-BIONTECH) 30 MCG: CPT

## 2021-06-09 ENCOUNTER — APPOINTMENT (OUTPATIENT)
Dept: LAB | Facility: HOSPITAL | Age: 46
End: 2021-06-09
Payer: COMMERCIAL

## 2021-06-09 DIAGNOSIS — R94.6 ABNORMAL THYROID FUNCTION TEST: ICD-10-CM

## 2021-06-09 LAB
T4 FREE SERPL-MCNC: 0.78 NG/DL (ref 0.76–1.46)
TSH SERPL DL<=0.05 MIU/L-ACNC: 3.86 UIU/ML (ref 0.36–3.74)

## 2021-06-09 PROCEDURE — 36415 COLL VENOUS BLD VENIPUNCTURE: CPT

## 2021-06-09 PROCEDURE — 84439 ASSAY OF FREE THYROXINE: CPT

## 2021-06-09 PROCEDURE — 84443 ASSAY THYROID STIM HORMONE: CPT

## 2021-07-29 ENCOUNTER — OFFICE VISIT (OUTPATIENT)
Dept: INTERNAL MEDICINE CLINIC | Facility: CLINIC | Age: 46
End: 2021-07-29
Payer: COMMERCIAL

## 2021-07-29 VITALS
SYSTOLIC BLOOD PRESSURE: 108 MMHG | HEIGHT: 74 IN | OXYGEN SATURATION: 98 % | DIASTOLIC BLOOD PRESSURE: 80 MMHG | HEART RATE: 79 BPM | BODY MASS INDEX: 25.15 KG/M2 | TEMPERATURE: 98 F | WEIGHT: 196 LBS | RESPIRATION RATE: 16 BRPM

## 2021-07-29 DIAGNOSIS — Z12.12 SCREENING FOR COLORECTAL CANCER: ICD-10-CM

## 2021-07-29 DIAGNOSIS — Z12.11 SCREENING FOR COLORECTAL CANCER: ICD-10-CM

## 2021-07-29 DIAGNOSIS — R94.6 ABNORMAL THYROID FUNCTION TEST: ICD-10-CM

## 2021-07-29 DIAGNOSIS — Z00.00 ANNUAL PHYSICAL EXAM: Primary | ICD-10-CM

## 2021-07-29 PROBLEM — M25.561 ACUTE PAIN OF RIGHT KNEE: Status: RESOLVED | Noted: 2019-09-10 | Resolved: 2021-07-29

## 2021-07-29 PROCEDURE — 3725F SCREEN DEPRESSION PERFORMED: CPT | Performed by: INTERNAL MEDICINE

## 2021-07-29 PROCEDURE — 3008F BODY MASS INDEX DOCD: CPT | Performed by: INTERNAL MEDICINE

## 2021-07-29 PROCEDURE — 99396 PREV VISIT EST AGE 40-64: CPT | Performed by: INTERNAL MEDICINE

## 2021-07-29 PROCEDURE — 4004F PT TOBACCO SCREEN RCVD TLK: CPT | Performed by: INTERNAL MEDICINE

## 2021-07-29 NOTE — PROGRESS NOTES
[facility] INTERNAL MEDICINE    NAME: Renata Dawn  AGE: 51 y o  SEX: male  : 1975     DATE: 2021     Assessment and Plan:     Problem List Items Addressed This Visit        Other    Abnormal thyroid function test     -near normal TSH  -neg thyroid antibodies  -monitor         Relevant Orders    TSH, 3rd generation with Free T4 reflex      Other Visit Diagnoses     Annual physical exam    -  Primary    Screening for colorectal cancer        Relevant Orders    Ambulatory referral to Gastroenterology          Immunizations and preventive care screenings were discussed with patient today  Appropriate education was printed on patient's after visit summary  Counseling:  Alcohol/drug use: discussed moderation in alcohol intake, the recommendations for healthy alcohol use, and avoidance of illicit drug use  Dental Health: discussed importance of regular tooth brushing, flossing, and dental visits  · Exercise: the importance of regular exercise/physical activity was discussed  Recommend exercise 3-5 times per week for at least 30 minutes  · Immunizations discussed  He received his COVID vaccines  Recommend yearly influenza vaccine  Consider tdap  · Cancer screenings discussed  Refer to GI for colonoscopy  BMI Counseling: Body mass index is 25 16 kg/m²  The BMI is above normal  Nutrition recommendations include decreasing portion sizes, encouraging healthy choices of fruits and vegetables, consuming healthier snacks, limiting drinks that contain sugar, moderation in carbohydrate intake and reducing intake of cholesterol  Exercise recommendations include moderate physical activity 150 minutes/week, exercising 3-5 times per week and strength training exercises  No pharmacotherapy was ordered   Continue current lifestyle          Return in about 1 year (around 2022) for Annual physical      Chief Complaint:     Chief Complaint Patient presents with    Physical Exam      History of Present Illness:     Adult Annual Physical   Patient with allergic rhinitis and CMC arthritis here for a comprehensive physical exam  The patient reports no problems  Diet and Physical Activity  · Diet/Nutrition: reduced alcohol intake, eating healthier overall  Minimal red meat      · Exercise: moderate cardiovascular exercise, strength training exercises and 5-7 times a week on average  Depression Screening  PHQ-9 Depression Screening    PHQ-9:   Frequency of the following problems over the past two weeks:      Little interest or pleasure in doing things: 0 - not at all  Feeling down, depressed, or hopeless: 0 - not at all  PHQ-2 Score: 0       General Health  · Sleep: 6 hours overnight      · Hearing: normal - bilateral   · Vision: no vision problems and most recent eye exam >1 year ago  · Dental: regular dental visits   Health  · Symptoms include: none     Review of Systems:     Review of Systems   Constitutional: Positive for activity change, appetite change and unexpected weight change (intentional weight loss)  Negative for fatigue  HENT: Positive for postnasal drip and rhinorrhea  Negative for hearing loss  Eyes: Negative for visual disturbance  Respiratory: Negative for cough, chest tightness, shortness of breath and wheezing  Cardiovascular: Negative for chest pain, palpitations and leg swelling  Gastrointestinal: Negative for abdominal pain, constipation, diarrhea, nausea and vomiting  Heartburn resolved   Genitourinary: Negative for difficulty urinating  Musculoskeletal: Positive for arthralgias (L thumb)  Negative for back pain and joint swelling  Neurological: Negative for dizziness and headaches  Psychiatric/Behavioral: Negative for decreased concentration, dysphoric mood and sleep disturbance  The patient is not nervous/anxious         Past Medical History:     Past Medical History:   Diagnosis Date    Acute pain of right knee 9/10/2019      Past Surgical History:     Past Surgical History:   Procedure Laterality Date    CONDYLOMA EXCISION/FULGURATION      HERNIA REPAIR Left 2016    WISDOM TOOTH EXTRACTION  2014      Family History:     Family History   Problem Relation Age of Onset    No Known Problems Mother     Diabetes Paternal Grandfather         mellitus     Diabetes Paternal Uncle         mellitus       Social History:     Social History     Socioeconomic History    Marital status: Single     Spouse name: None    Number of children: None    Years of education: None    Highest education level: None   Occupational History    Occupation: Near [employer]    Tobacco Use    Smoking status: Current Every Day Smoker     Packs/day: 0 25    Smokeless tobacco: Never Used    Tobacco comment: 3 packs per week   Substance and Sexual Activity    Alcohol use: Yes     Comment: drinks 6 drinks per week    Drug use: Yes     Frequency: 3 0 times per week     Types: Marijuana     Comment: denies IVDA    Sexual activity: Yes     Partners: Female   Other Topics Concern    None   Social History Narrative         Social Determinants of Health     Financial Resource Strain:     Difficulty of Paying Living Expenses:    Food Insecurity:     Worried About Running Out of Food in the Last Year:     Ran Out of Food in the Last Year:    Transportation Needs:     Lack of Transportation (Medical):      Lack of Transportation (Non-Medical):    Physical Activity:     Days of Exercise per Week:     Minutes of Exercise per Session:    Stress:     Feeling of Stress :    Social Connections:     Frequency of Communication with Friends and Family:     Frequency of Social Gatherings with Friends and Family:     Attends [employer]oration[employer] Services:     Active Member of Clubs or Organizations:     Attends Club or Organization Meetings:     Marital Status:    Intimate Partner Violence:     Fear of Current or Ex-Partner:     Emotionally Abused:     Physically Abused:     Sexually Abused:       Current Medications:     Current Outpatient Medications   Medication Sig Dispense Refill    diclofenac sodium (VOLTAREN) 1 % Apply 2 g topically 4 (four) times a day 100 g 0    loratadine (CLARITIN) 10 mg tablet Take 10 mg by mouth      meloxicam (MOBIC) 7 5 mg tablet Take 1 tablet (7 5 mg total) by mouth daily 30 tablet 2    Multiple Vitamin (MULTIVITAMINS PO) Take by mouth      Omega-3 Fatty Acids (FISH OIL) 645 MG CAPS Take by mouth      podofilox (CONDYLOX) 0 5 % gel Apply topically 2 (two) times a day Apply for 3 days followed by three days of no therapy  Repeat for 4 cycles 3 5 g 0    Sulfamethoxazole Micro POWD  (Patient not taking: Reported on 7/29/2021)       No current facility-administered medications for this visit  Allergies:     No Known Allergies   Physical Exam:     /80 (BP Location: Right arm, Patient Position: Sitting, Cuff Size: Standard)   Pulse 79   Temp 98 °F (36 7 °C)   Resp 16   Ht 6' 2" (1 88 m)   Wt 88 9 kg (196 lb)   SpO2 98%   BMI 25 16 kg/m²     Physical Exam  Vitals reviewed  Constitutional:       General: He is not in acute distress  Appearance: Normal appearance  He is overweight  HENT:      Head: Normocephalic  Right Ear: Tympanic membrane, ear canal and external ear normal  There is no impacted cerumen  Left Ear: Tympanic membrane, ear canal and external ear normal  There is no impacted cerumen  Nose: Congestion (minimal R nasal turbinate hypertrophy) present  No rhinorrhea  Mouth/Throat:      Mouth: Mucous membranes are moist       Pharynx: Oropharynx is clear  No oropharyngeal exudate or posterior oropharyngeal erythema  Eyes:      Extraocular Movements: Extraocular movements intact  Conjunctiva/sclera: Conjunctivae normal       Pupils: Pupils are equal, round, and reactive to light  Neck:      Thyroid: No thyromegaly or thyroid tenderness     Cardiovascular: Rate and Rhythm: Normal rate and regular rhythm  Pulses: Normal pulses  Heart sounds: Normal heart sounds  No murmur heard  Pulmonary:      Effort: Pulmonary effort is normal  No respiratory distress  Breath sounds: Normal breath sounds  No wheezing  Abdominal:      General: Bowel sounds are normal  There is no distension  Palpations: Abdomen is soft  Tenderness: There is no abdominal tenderness  Musculoskeletal:      Cervical back: Neck supple  No tenderness  Right lower leg: No edema  Left lower leg: No edema  Comments: BL CMC joint hypertrophy L>R, L CMC joint crepitus   Lymphadenopathy:      Cervical: No cervical adenopathy  Skin:     Coloration: Skin is not jaundiced or pale  Neurological:      General: No focal deficit present  Mental Status: He is alert and oriented to person, place, and time  Psychiatric:         Attention and Perception: Attention normal          Mood and Affect: Mood normal          Speech: Speech normal          Behavior: Behavior is cooperative          Recent Results (from the past 1344 hour(s))   TSH, 3rd generation with Free T4 reflex    Collection Time: 06/09/21  7:52 AM   Result Value Ref Range    TSH 3RD GENERATON 3 860 (H) 0 358 - 3 740 uIU/mL   T4, free    Collection Time: 06/09/21  7:52 AM   Result Value Ref Range    Free T4 0 78 0 76 - 1 46 ng/dL         Otis Dunn DO  [facility] INTERNAL MEDICINE

## 2021-07-29 NOTE — PATIENT INSTRUCTIONS
Wellness Visit for Adults   AMBULATORY CARE:   A wellness visit  is when you see your healthcare provider to get screened for health problems  Your healthcare provider will also give you advice on how to stay healthy  Write down your questions so you remember to ask them  Ask your healthcare provider how often you should have a wellness visit  What happens at a wellness visit:  Your healthcare provider will ask about your health, and your family history of health problems  This includes high blood pressure, heart disease, and cancer  He or she will ask if you have symptoms that concern you, if you smoke, and about your mood  You may also be asked about your intake of medicines, supplements, food, and alcohol  Any of the following may be done:  · Your weight  will be checked  Your height may also be checked so your body mass index (BMI) can be calculated  Your BMI shows if you are at a healthy weight  · Your blood pressure  and heart rate will be checked  Your temperature may also be checked  · Blood and urine tests  may be done  Blood tests may be done to check your cholesterol levels  Abnormal cholesterol levels increase your risk for heart disease and stroke  You may also need a blood or urine test to check for diabetes if you are at increased risk  Urine tests may be done to look for signs of an infection or kidney disease  · A physical exam  includes checking your heartbeat and lungs with a stethoscope  Your healthcare provider may also check your skin to look for sun damage  · Screening tests  may be recommended  A screening test is done to check for diseases that may not cause symptoms  The screening tests you may need depend on your age, gender, family history, and lifestyle habits  For example, colorectal screening may be recommended if you are 48years old or older  Screening tests you need if you are a woman:   · A Pap smear  is used to screen for cervical cancer   Pap smears are usually done every 3 to 5 years depending on your age  You may need them more often if you have had abnormal Pap smear test results in the past  Ask your healthcare provider how often you should have a Pap smear  · A mammogram  is an x-ray of your breasts to screen for breast cancer  Experts recommend mammograms every 2 years starting at age 48 years  You may need a mammogram at age 52 years or younger if you have an increased risk for breast cancer  Talk to your healthcare provider about when you should start having mammograms and how often you need them  Vaccines you may need:   · Get an influenza vaccine  every year  The influenza vaccine protects you from the flu  Several types of viruses cause the flu  The viruses change over time, so new vaccines are made each year  · Get a tetanus-diphtheria (Td) booster vaccine  every 10 years  This vaccine protects you against tetanus and diphtheria  Tetanus is a severe infection that may cause painful muscle spasms and lockjaw  Diphtheria is a severe bacterial infection that causes a thick covering in the back of your mouth and throat  · Get a human papillomavirus (HPV) vaccine  if you are female and aged 23 to 32 or male 23 to 24 and never received it  This vaccine protects you from HPV infection  HPV is the most common infection spread by sexual contact  HPV may also cause vaginal, penile, and anal cancers  · Get a pneumococcal vaccine  if you are aged 72 years or older  The pneumococcal vaccine is an injection given to protect you from pneumococcal disease  Pneumococcal disease is an infection caused by pneumococcal bacteria  The infection may cause pneumonia, meningitis, or an ear infection  · Get a shingles vaccine  if you are 60 or older, even if you have had shingles before  The shingles vaccine is an injection to protect you from the varicella-zoster virus  This is the same virus that causes chickenpox   Shingles is a painful rash that develops in people who had chickenpox or have been exposed to the virus  How to eat healthy:  My Plate is a model for planning healthy meals  It shows the types and amounts of foods that should go on your plate  Fruits and vegetables make up about half of your plate, and grains and protein make up the other half  A serving of dairy is included on the side of your plate  The amount of calories and serving sizes you need depends on your age, gender, weight, and height  Examples of healthy foods are listed below:  · Eat a variety of vegetables  such as dark green, red, and orange vegetables  You can also include canned vegetables low in sodium (salt) and frozen vegetables without added butter or sauces  · Eat a variety of fresh fruits , canned fruit in 100% juice, frozen fruit, and dried fruit  · Include whole grains  At least half of the grains you eat should be whole grains  Examples include whole-wheat bread, wheat pasta, brown rice, and whole-grain cereals such as oatmeal     · Eat a variety of protein foods such as seafood (fish and shellfish), lean meat, and poultry without skin (turkey and chicken)  Examples of lean meats include pork leg, shoulder, or tenderloin, and beef round, sirloin, tenderloin, and extra lean ground beef  Other protein foods include eggs and egg substitutes, beans, peas, soy products, nuts, and seeds  · Choose low-fat dairy products such as skim or 1% milk or low-fat yogurt, cheese, and cottage cheese  · Limit unhealthy fats  such as butter, hard margarine, and shortening  Exercise:  Exercise at least 30 minutes per day on most days of the week  Some examples of exercise include walking, biking, dancing, and swimming  You can also fit in more physical activity by taking the stairs instead of the elevator or parking farther away from stores  Include muscle strengthening activities 2 days each week  Regular exercise provides many health benefits   It helps you manage your weight, and decreases your risk for type 2 diabetes, heart disease, stroke, and high blood pressure  Exercise can also help improve your mood  Ask your healthcare provider about the best exercise plan for you  General health and safety guidelines:   · Do not smoke  Nicotine and other chemicals in cigarettes and cigars can cause lung damage  Ask your healthcare provider for information if you currently smoke and need help to quit  E-cigarettes or smokeless tobacco still contain nicotine  Talk to your healthcare provider before you use these products  · Limit alcohol  A drink of alcohol is 12 ounces of beer, 5 ounces of wine, or 1½ ounces of liquor  · Lose weight, if needed  Being overweight increases your risk of certain health conditions  These include heart disease, high blood pressure, type 2 diabetes, and certain types of cancer  · Protect your skin  Do not sunbathe or use tanning beds  Use sunscreen with a SPF 15 or higher  Apply sunscreen at least 15 minutes before you go outside  Reapply sunscreen every 2 hours  Wear protective clothing, hats, and sunglasses when you are outside  · Drive safely  Always wear your seatbelt  Make sure everyone in your car wears a seatbelt  A seatbelt can save your life if you are in an accident  Do not use your cell phone when you are driving  This could distract you and cause an accident  Pull over if you need to make a call or send a text message  · Practice safe sex  Use latex condoms if are sexually active and have more than one partner  Your healthcare provider may recommend screening tests for sexually transmitted infections (STIs)  · Wear helmets, lifejackets, and protective gear  Always wear a helmet when you ride a bike or motorcycle, go skiing, or play sports that could cause a head injury  Wear protective equipment when you play sports  Wear a lifejacket when you are on a boat or doing water sports      © Copyright [employer] 2021 Information is for End User's use only and may not be sold, redistributed or otherwise used for commercial purposes  All illustrations and images included in CareNotes® are the copyrighted property of [employer] D [employer] M [employer]  or [employer]  The above information is an  only  It is not intended as medical advice for individual conditions or treatments  Talk to your doctor, nurse or pharmacist before following any medical regimen to see if it is safe and effective for you  Cholesterol and Your Health   AMBULATORY CARE:   Cholesterol  is a waxy, fat-like substance  Your body uses cholesterol to make hormones and new cells, and to protect nerves  Cholesterol is made by your body  It also comes from certain foods you eat, such as meat and dairy products  Your healthcare provider can help you set goals for your cholesterol levels  He or she can help you create a plan to meet your goals  Cholesterol level goals: Your cholesterol level goals depend on your risk for heart disease, your age, and your other health conditions  The following are general guidelines:  · Total cholesterol  includes low-density lipoprotein (LDL), high-density lipoprotein (HDL), and triglyceride levels  The total cholesterol level should be lower than 200 mg/dL and is best at about 150 mg/dL  · LDL cholesterol  is called bad cholesterol  because it forms plaque in your arteries  As plaque builds up, your arteries become narrow, and less blood flows through  When plaque decreases blood flow to your heart, you may have chest pain  If plaque completely blocks an artery that brings blood to your heart, you may have a heart attack  Plaque can break off and form blood clots  Blood clots may block arteries in your brain and cause a stroke  The level should be less than 130 mg/dL and is best at about 100 mg/dL  · HDL cholesterol  is called good cholesterol  because it helps remove LDL cholesterol from your arteries   It does this by attaching to LDL cholesterol and carrying it to your liver  Your liver breaks down LDL cholesterol so your body can get rid of it  High levels of HDL cholesterol can help prevent a heart attack and stroke  Low levels of HDL cholesterol can increase your risk for heart disease, heart attack, and stroke  The level should be 60 mg/dL or higher  · Triglycerides  are a type of fat that store energy from foods you eat  High levels of triglycerides also cause plaque buildup  This can increase your risk for a heart attack or stroke  If your triglyceride level is high, your LDL cholesterol level may also be high  The level should be less than 150 mg/dL  Any of the following can increase your risk for high cholesterol:   · Smoking cigarettes    · Being overweight or obese, or not getting enough exercise    · Drinking large amounts of alcohol    · A medical condition such as hypertension (high blood pressure) or diabetes    · Certain genes passed from your parents to you    · Age older than 65 years    What you need to know about having your cholesterol levels checked: Adults 21to 39years of age should have their cholesterol levels checked every 4 to 6 years  Adults 45 years or older should have their cholesterol checked every 1 to 2 years  You may need your cholesterol checked more often, or at a younger age, if you have risk factors for heart disease  You may also need to have your cholesterol checked more often if you have other health conditions, such as diabetes  Blood tests are used to check cholesterol levels  Blood tests measure your levels of triglycerides, LDL cholesterol, and HDL cholesterol  How healthy fats affect your cholesterol levels:  Healthy fats, also called unsaturated fats, help lower LDL cholesterol and triglyceride levels  Healthy fats include the following:  · Monounsaturated fats  are found in foods such as olive oil, canola oil, avocado, nuts, and olives      · Polyunsaturated fats,  such as omega 3 fats, are found in fish, such as salmon, trout, and tuna  They can also be found in plant foods such as flaxseed, walnuts, and soybeans  How unhealthy fats affect your cholesterol levels:  Unhealthy fats increase LDL cholesterol and triglyceride levels  They are found in foods high in cholesterol, saturated fat, and trans fat:  · Cholesterol  is found in eggs, dairy, and meat  · Saturated fat  is found in butter, cheese, ice cream, whole milk, and coconut oil  Saturated fat is also found in meat, such as sausage, hot dogs, and bologna  · Trans fat  is found in liquid oils and is used in fried and baked foods  Foods that contain trans fats include chips, crackers, muffins, sweet rolls, microwave popcorn, and cookies  Treatment  for high cholesterol will also decrease your risk of heart disease, heart attack, and stroke  Treatment may include any of the following:  · Lifestyle changes  may include food, exercise, weight loss, and quitting smoking  You may also need to decrease the amount of alcohol you drink  Your healthcare provider will want you to start with lifestyle changes  Other treatment may be added if lifestyle changes are not enough  Your healthcare provider may recommend you work with a team to manage hyperlipidemia  The team may include medical experts such as a dietitian, an exercise or physical therapist, and a behavior therapist  Your family members may be included in helping you create lifestyle changes  · Medicines  may be given to lower your LDL cholesterol, triglyceride levels, or total cholesterol level  You may need medicines to lower your cholesterol if any of the following is true:    ? You have a history of stroke, TIA, unstable angina, or a heart attack  ? Your LDL cholesterol level is 190 mg/dL or higher  ? You are age 36 to 76 years, have diabetes or heart disease risk factors, and your LDL cholesterol is 70 mg/dL or higher      · Supplements  include fish oil, red yeast rice, and garlic  Fish oil may help lower your triglyceride and LDL cholesterol levels  It may also increase your HDL cholesterol level  Red yeast rice may help decrease your total cholesterol level and LDL cholesterol level  Garlic may help lower your total cholesterol level  Do not take any supplements without talking to your healthcare provider  Food changes you can make to lower your cholesterol levels:  A dietitian can help you create a healthy eating plan  He or she can show you how to read food labels and choose foods low in saturated fat, trans fats, and cholesterol  · Decrease the total amount of fat you eat  Choose lean meats, fat-free or 1% fat milk, and low-fat dairy products, such as yogurt and cheese  Try to limit or avoid red meats  Limit or do not eat fried foods or baked goods, such as cookies  · Replace unhealthy fats with healthy fats  Cook foods in olive oil or canola oil  Choose soft margarines that are low in saturated fat and trans fat  Seeds, nuts, and avocados are other examples of healthy fats  · Eat foods with omega-3 fats  Examples include salmon, tuna, mackerel, walnuts, and flaxseed  Eat fish 2 times per week  Pregnant women should not eat fish that have high levels of mercury, such as shark, swordfish, and inocencia mackerel  · Increase the amount of high-fiber foods you eat  High-fiber foods can help lower your LDL cholesterol  Aim to get between 20 and 30 grams of fiber each day  Fruits and vegetables are high in fiber  Eat at least 5 servings each day  Other high-fiber foods are whole-grain or whole-wheat breads, pastas, or cereals, and brown rice  Eat 3 ounces of whole-grain foods each day  Increase fiber slowly  You may have abdominal discomfort, bloating, and gas if you add fiber to your diet too quickly  · Eat healthy protein foods  Examples include low-fat dairy products, skinless chicken and turkey, fish, and nuts      · Limit foods and drinks that are high in sugar  Your dietitian or healthcare provider can help you create daily limits for high-sugar foods and drinks  The limit may be lower if you have diabetes or another health condition  Limits can also help you lose weight if needed  Lifestyle changes you can make to lower your cholesterol levels:   · Maintain a healthy weight  Ask your healthcare provider what a healthy weight is for you  Ask him or her to help you create a weight loss plan if needed  Weight loss can decrease your total cholesterol and triglyceride levels  Weight loss may also help keep your blood pressure at a healthy level  · Be physically active throughout the day  Physical activity, such as exercise, can help lower your total cholesterol level and maintain a healthy weight  Physical activity can also help increase your HDL cholesterol level  Work with your healthcare provider to create an program that is right for you  Get at least 30 to 40 minutes of moderate physical activity most days of the week  Examples of exercise include brisk walking, swimming, or biking  Also include strength training at least 2 times each week  Your healthcare providers can help you create a physical activity plan  · Do not smoke  Nicotine and other chemicals in cigarettes and cigars can raise your cholesterol levels  Ask your healthcare provider for information if you currently smoke and need help to quit  E-cigarettes or smokeless tobacco still contain nicotine  Talk to your healthcare provider before you use these products  · Limit or do not drink alcohol  Alcohol can increase your triglyceride levels  Ask your healthcare provider before you drink alcohol  Ask how much is okay for you to drink in 24 hours or 1 week  Follow up with your doctor as directed:  Write down your questions so you remember to ask them during your visits    © Copyright [employer] 2021 Information is for End User's use only and may not be sold, redistributed or otherwise used for commercial purposes  All illustrations and images included in CareNotes® are the copyrighted property of [employer]  or [employer]  The above information is an  only  It is not intended as medical advice for individual conditions or treatments  Talk to your doctor, nurse or pharmacist before following any medical regimen to see if it is safe and effective for you

## 2021-08-18 ENCOUNTER — OFFICE VISIT (OUTPATIENT)
Dept: OBGYN CLINIC | Facility: HOSPITAL | Age: 46
End: 2021-08-18
Payer: COMMERCIAL

## 2021-08-18 VITALS
WEIGHT: 201.2 LBS | BODY MASS INDEX: 25.82 KG/M2 | HEART RATE: 60 BPM | DIASTOLIC BLOOD PRESSURE: 71 MMHG | HEIGHT: 74 IN | SYSTOLIC BLOOD PRESSURE: 117 MMHG

## 2021-08-18 DIAGNOSIS — M18.12 PRIMARY OSTEOARTHRITIS OF FIRST CARPOMETACARPAL JOINT OF LEFT HAND: Primary | ICD-10-CM

## 2021-08-18 PROCEDURE — 20600 DRAIN/INJ JOINT/BURSA W/O US: CPT | Performed by: ORTHOPAEDIC SURGERY

## 2021-08-18 PROCEDURE — 3008F BODY MASS INDEX DOCD: CPT | Performed by: ORTHOPAEDIC SURGERY

## 2021-08-18 PROCEDURE — 99213 OFFICE O/P EST LOW 20 MIN: CPT | Performed by: ORTHOPAEDIC SURGERY

## 2021-08-18 PROCEDURE — 4004F PT TOBACCO SCREEN RCVD TLK: CPT | Performed by: ORTHOPAEDIC SURGERY

## 2021-08-18 RX ORDER — LIDOCAINE HYDROCHLORIDE 10 MG/ML
0.5 INJECTION, SOLUTION EPIDURAL; INFILTRATION; INTRACAUDAL; PERINEURAL
Status: COMPLETED | OUTPATIENT
Start: 2021-08-18 | End: 2021-08-18

## 2021-08-18 RX ORDER — METHYLPREDNISOLONE ACETATE 40 MG/ML
0.5 INJECTION, SUSPENSION INTRA-ARTICULAR; INTRALESIONAL; INTRAMUSCULAR; SOFT TISSUE
Status: COMPLETED | OUTPATIENT
Start: 2021-08-18 | End: 2021-08-18

## 2021-08-18 RX ADMIN — METHYLPREDNISOLONE ACETATE 0.5 ML: 40 INJECTION, SUSPENSION INTRA-ARTICULAR; INTRALESIONAL; INTRAMUSCULAR; SOFT TISSUE at 09:24

## 2021-08-18 RX ADMIN — LIDOCAINE HYDROCHLORIDE 0.5 ML: 10 INJECTION, SOLUTION EPIDURAL; INFILTRATION; INTRACAUDAL; PERINEURAL at 09:24

## 2021-08-18 NOTE — PROGRESS NOTES
ASSESSMENT/PLAN:    Assessment:   Thumb CMC Arthritis  left    Plan:   The patient will proceed with a left thumb CMC jt steroid injection WITH DEPO today  He has no formal restrictions  Follow Up:  3  month(s)    To Do Next Visit:       General Discussions:     CMC Arthritis: The anatomy and physiology of carpometacarpal joint arthritis was discussed with the patient today in the office  Deterioration of the articular cartilage eventually leads to hypermobility at the thumb [facility] joint, resulting in joint subluxation, osteophyte formation, cystic changes within the trapezium and base of the first metacarpal, as well as subchondral sclerosis  Eventually, pain, limited mobility, and compensatory hyperextension at the metacarpophalangeal joint may develop  While normal activity and usage of the thumb joint may provide a painful experience to the patient, this typically does not result in damage to the thumb or hand  Treatment options include resting thumb spica splints to decreased joint edema, pain, and inflammation  Therapy exercises to strengthen the thenar musculature may relieve pain, but do not alter the overall continued development of osteoarthritis  Oral medications, topical medications, corticosteroid injections may decrease pain and increase overall function  Eventually, approximately 5% of patients may require surgical intervention  _____________________________________________________  CHIEF COMPLAINT:  Chief Complaint   Patient presents with    Left Thumb - Follow-up     Depo only inj given 3/24/21         SUBJECTIVE:  Concha Johns is a 51 y o  male who presents for follow up regarding Thumb CMC Arthritis  left  Since last visit, Concha Johns has tried steroid injections with relief  Today there is Pain  Severe  Intermittant  Sharp to the left thumb      Radiation: Yes to the  thumb  Associated symptoms: No Complaints  Work status: works as a [employer]     PAST MEDICAL HISTORY:  Past Medical History:   Diagnosis Date    Acute pain of right knee 9/10/2019       PAST SURGICAL HISTORY:  Past Surgical History:   Procedure Laterality Date    CONDYLOMA EXCISION/FULGURATION      HERNIA REPAIR Left 2016    WISDOM TOOTH EXTRACTION  2014       FAMILY HISTORY:  Family History   Problem Relation Age of Onset    No Known Problems Mother     Diabetes Paternal Grandfather         mellitus     Diabetes Paternal Uncle         mellitus        SOCIAL HISTORY:  Social History     Tobacco Use    Smoking status: Current Every Day Smoker     Packs/day: 0 25    Smokeless tobacco: Never Used    Tobacco comment: 3 packs per week   Substance Use Topics    Alcohol use: Yes     Comment: drinks 6 drinks per week    Drug use: Yes     Frequency: 3 0 times per week     Types: Marijuana     Comment: denies IVDA       MEDICATIONS:    Current Outpatient Medications:     diclofenac sodium (VOLTAREN) 1 %, Apply 2 g topically 4 (four) times a day, Disp: 100 g, Rfl: 0    loratadine (CLARITIN) 10 mg tablet, Take 10 mg by mouth, Disp: , Rfl:     meloxicam (MOBIC) 7 5 mg tablet, Take 1 tablet (7 5 mg total) by mouth daily, Disp: 30 tablet, Rfl: 2    Multiple Vitamin (MULTIVITAMINS PO), Take by mouth, Disp: , Rfl:     Omega-3 Fatty Acids (FISH OIL) 645 MG CAPS, Take by mouth, Disp: , Rfl:     podofilox (CONDYLOX) 0 5 % gel, Apply topically 2 (two) times a day Apply for 3 days followed by three days of no therapy  Repeat for 4 cycles, Disp: 3 5 g, Rfl: 0    Sulfamethoxazole Micro POWD, , Disp: , Rfl:     ALLERGIES:  No Known Allergies    REVIEW OF SYSTEMS:  Pertinent items are noted in HPI      LABS:  HgA1c:   Lab Results   Component Value Date    HGBA1C 5 4 09/05/2019     BMP: No results found for: GLUCOSE, CALCIUM, NA, K, CO2, CL, BUN, CREATININE        _____________________________________________________  PHYSICAL EXAMINATION:  Vital signs: /71   Pulse 60   Ht 6' 2" (1 88 m)   Wt 91 3 kg (201 lb 3 2 oz) BMI 25 83 kg/m²   General: well developed and well nourished, alert, oriented times 3 and appears comfortable  Psychiatric: Normal  HEENT: Trachea Midline, No torticollis  Cardiovascular: No discernable arrhythmia  Pulmonary: No wheezing or stridor  Skin: No masses, erythema, lacerations, fluctation, ulcerations  Neurovascular: Sensation Intact to the Median, Ulnar, Radial Nerve, Motor Intact to the Median, Ulnar, Radial Nerve and Pulses Intact    MUSCULOSKELETAL EXAMINATION:  LEFT SIDE:  CMC: No Instability, Positive grind, Positive tendnerness CMC and no hyperextension at MP jt and [place]:  Negative finkelstein and Negative trigger thumb    _____________________________________________________  STUDIES REVIEWED:  No Studies to review      PROCEDURES PERFORMED:  Small joint arthrocentesis: L thumb CMC  [place] Protocol:  Consent given by: patient  Patient understanding: patient states understanding of the procedure being performed  Site marked: the operative site was marked  Patient identity confirmed: verbally with patient    Supporting Documentation  Indications: pain   Procedure Details  Location: thumb - L thumb CMC  Preparation: Patient was prepped and draped in the usual sterile fashion  Needle size: 25 G  Ultrasound guidance: no  Approach: radial  Medications administered: 0 5 mL lidocaine (PF) 1 %; 0 5 mL methylPREDNISolone acetate 40 mg/mL    Patient tolerance: patient tolerated the procedure well with no immediate complications  Dressing:  Sterile dressing applied              Scribe Attestation    I,:  Aaron Ontiveros am acting as a scribe while in the presence of the attending physician :       I,:  Dimitri Conner MD personally performed the services described in this documentation    as scribed in my presence :

## 2021-10-19 ENCOUNTER — TELEMEDICINE (OUTPATIENT)
Dept: INTERNAL MEDICINE CLINIC | Facility: CLINIC | Age: 46
End: 2021-10-19
Payer: COMMERCIAL

## 2021-10-19 VITALS — HEIGHT: 74 IN | WEIGHT: 201 LBS | BODY MASS INDEX: 25.8 KG/M2

## 2021-10-19 DIAGNOSIS — B34.9 VIRAL INFECTION, UNSPECIFIED: Primary | ICD-10-CM

## 2021-10-19 PROCEDURE — 99212 OFFICE O/P EST SF 10 MIN: CPT | Performed by: NURSE PRACTITIONER

## 2022-01-04 DIAGNOSIS — M18.12 PRIMARY OSTEOARTHRITIS OF FIRST CARPOMETACARPAL JOINT OF LEFT HAND: ICD-10-CM

## 2022-01-04 RX ORDER — MELOXICAM 7.5 MG/1
TABLET ORAL
Qty: 30 TABLET | Refills: 2 | Status: SHIPPED | OUTPATIENT
Start: 2022-01-04

## 2022-01-10 ENCOUNTER — OFFICE VISIT (OUTPATIENT)
Dept: GASTROENTEROLOGY | Facility: CLINIC | Age: 47
End: 2022-01-10
Payer: COMMERCIAL

## 2022-01-10 VITALS
DIASTOLIC BLOOD PRESSURE: 75 MMHG | HEIGHT: 74 IN | WEIGHT: 205 LBS | BODY MASS INDEX: 26.31 KG/M2 | SYSTOLIC BLOOD PRESSURE: 110 MMHG | TEMPERATURE: 98.9 F | HEART RATE: 90 BPM

## 2022-01-10 DIAGNOSIS — Z12.12 SCREENING FOR COLORECTAL CANCER: ICD-10-CM

## 2022-01-10 DIAGNOSIS — Z12.11 SCREENING FOR COLORECTAL CANCER: ICD-10-CM

## 2022-01-10 PROCEDURE — 4004F PT TOBACCO SCREEN RCVD TLK: CPT | Performed by: INTERNAL MEDICINE

## 2022-01-10 PROCEDURE — 3008F BODY MASS INDEX DOCD: CPT | Performed by: INTERNAL MEDICINE

## 2022-01-10 PROCEDURE — 99243 OFF/OP CNSLTJ NEW/EST LOW 30: CPT | Performed by: INTERNAL MEDICINE

## 2022-01-10 RX ORDER — POLYETHYLENE GLYCOL 3350 17 G/17G
POWDER, FOR SOLUTION ORAL
Qty: 238 G | Refills: 0 | Status: SHIPPED | OUTPATIENT
Start: 2022-01-10

## 2022-01-10 NOTE — PROGRESS NOTES
[facility] - Outpatient Consultation  Libertad Hood 51 y o  male MRN: [id number]  Encounter: [id number]          ASSESSMENT AND PLAN:      1  Screening for colorectal cancer    Will proceed with screening colonoscopy  - Ambulatory referral to Gastroenterology  - Colonoscopy; Future  - polyethylene glycol (GLYCOLAX) 17 GM/SCOOP powder; At 5pm take 5mgx2 dulcolax  At 6pm 32oz miralax in 64oz gatorade  Drink 8oz glass every 5 mins until 32oz finished  Drink remaining as rec  Dispense: 238 g; Refill: 0    The rationale for colonoscopy with possible biopsy, possible polypectomy, with IV sedation as well as all risks, benefits, and alternatives were discussed with the patient in detail  Risks including but not limited to perforation, bleeding, need for blood transfusion or emergent surgery, and missed neoplasm were reviewed in detail with the patient  The patient demonstrates full understanding and wishes to proceed with the colonoscopy   ______________________________________________________________    HPI:  Libertad Hood is a 51y o  year old male who presents to the office for evaluation for colorectal cancer screening  Reported symptoms: none  Family history: no known risk factors  Previous colonoscopy: none    No blood in stool  No weight loss  No family history of colon cancer  No change in bowel habits  They are not on blood thinners  REVIEW OF SYSTEMS:    CONSTITUTIONAL: Denies any fever, chills, rigors, and weight loss  HEENT: No earache or tinnitus  Denies hearing loss or visual disturbances  CARDIOVASCULAR: No chest pain or palpitations  RESPIRATORY: Denies any cough, hemoptysis, shortness of breath or dyspnea on exertion  GASTROINTESTINAL: As noted in the History of Present Illness  GENITOURINARY: No problems with urination  Denies any hematuria or dysuria  NEUROLOGIC: No dizziness or vertigo, denies headaches     MUSCULOSKELETAL: Denies any muscle or joint pain    SKIN: Denies skin rashes or itching  ENDOCRINE: Denies excessive thirst  Denies intolerance to heat or cold  PSYCHOSOCIAL: Denies depression or anxiety  Denies any recent memory loss  Historical Information   Past Medical History:   Diagnosis Date    Acute pain of right knee 9/10/2019     Past Surgical History:   Procedure Laterality Date    CONDYLOMA EXCISION/FULGURATION      HERNIA REPAIR Left 2016    WISDOM TOOTH EXTRACTION  2014     Social History   Social History     Substance and Sexual Activity   Alcohol Use Yes    Comment: drinks 6 drinks per week     Social History     Substance and Sexual Activity   Drug Use Yes    Frequency: 3 0 times per week    Types: Marijuana    Comment: denies IVDA     Social History     Tobacco Use   Smoking Status Current Every Day Smoker    Packs/day: 0 25   Smokeless Tobacco Never Used   Tobacco Comment    3 packs per week     Family History   Problem Relation Age of Onset    No Known Problems Mother     Diabetes Paternal Grandfather         mellitus     Diabetes Paternal Uncle         mellitus        Meds/Allergies       Current Outpatient Medications:     diclofenac sodium (VOLTAREN) 1 %    loratadine (CLARITIN) 10 mg tablet    meloxicam (MOBIC) 7 5 mg tablet    Multiple Vitamin (MULTIVITAMINS PO)    Omega-3 Fatty Acids (FISH OIL) 645 MG CAPS    podofilox (CONDYLOX) 0 5 % gel    polyethylene glycol (GLYCOLAX) 17 GM/SCOOP powder    Sulfamethoxazole Micro POWD    No Known Allergies        Objective     Blood pressure 110/75, pulse 90, temperature 98 9 °F (37 2 °C), temperature source Tympanic, height 6' 2" (1 88 m), weight 93 kg (205 lb)  Body mass index is 26 32 kg/m²  PHYSICAL EXAM:      General Appearance:   Alert, cooperative, no distress   HEENT:   Normocephalic, atraumatic, anicteric       Lungs:   Equal chest rise and unlabored breathing, normal cough   Heart:   No visualized JVD   Abdomen:   Soft, non-tender, non-distended; no masses, no organomegaly    Genitalia:   Deferred    Rectal:   Deferred    Extremities:  No cyanosis, clubbing or edema    Pulses:  Musculoskeletal:  2+ and symmetric  Normal range of motion visualized    Skin:  Neuro:  No jaundice, rashes, or lesions   Alert and appropriate       Lab Results:   No visits with results within 1 Day(s) from this visit  Latest known visit with results is:   Appointment on 06/09/2021   Component Date Value    TSH 3RD GENERATON 06/09/2021 3 860*    Free T4 06/09/2021 0 78          Radiology Results:   No results found

## 2022-01-10 NOTE — PATIENT INSTRUCTIONS
Scheduled date of colonoscopy (as of today):  3/29/22  Physician performing colonoscopy:  Dr Beatriz Cunningham  Location of colonoscopy:   [facility]  Bowel prep reviewed with patient:  Dulcolax/Miralax  Instructions reviewed with patient by:  Joyce   Clearances: n/a

## 2022-02-11 ENCOUNTER — OFFICE VISIT (OUTPATIENT)
Dept: OBGYN CLINIC | Facility: HOSPITAL | Age: 47
End: 2022-02-11
Payer: COMMERCIAL

## 2022-02-11 VITALS
SYSTOLIC BLOOD PRESSURE: 124 MMHG | HEART RATE: 78 BPM | WEIGHT: 205.4 LBS | DIASTOLIC BLOOD PRESSURE: 71 MMHG | HEIGHT: 74 IN | BODY MASS INDEX: 26.36 KG/M2

## 2022-02-11 DIAGNOSIS — M18.12 PRIMARY OSTEOARTHRITIS OF FIRST CARPOMETACARPAL JOINT OF LEFT HAND: Primary | ICD-10-CM

## 2022-02-11 PROCEDURE — 3008F BODY MASS INDEX DOCD: CPT | Performed by: ORTHOPAEDIC SURGERY

## 2022-02-11 PROCEDURE — 20600 DRAIN/INJ JOINT/BURSA W/O US: CPT | Performed by: ORTHOPAEDIC SURGERY

## 2022-02-11 PROCEDURE — 4004F PT TOBACCO SCREEN RCVD TLK: CPT | Performed by: ORTHOPAEDIC SURGERY

## 2022-02-11 PROCEDURE — 99213 OFFICE O/P EST LOW 20 MIN: CPT | Performed by: ORTHOPAEDIC SURGERY

## 2022-02-11 RX ORDER — METHYLPREDNISOLONE ACETATE 40 MG/ML
0.5 INJECTION, SUSPENSION INTRA-ARTICULAR; INTRALESIONAL; INTRAMUSCULAR; SOFT TISSUE
Status: COMPLETED | OUTPATIENT
Start: 2022-02-11 | End: 2022-02-11

## 2022-02-11 RX ORDER — LIDOCAINE HYDROCHLORIDE 10 MG/ML
0.5 INJECTION, SOLUTION INFILTRATION; PERINEURAL
Status: COMPLETED | OUTPATIENT
Start: 2022-02-11 | End: 2022-02-11

## 2022-02-11 RX ADMIN — METHYLPREDNISOLONE ACETATE 0.5 ML: 40 INJECTION, SUSPENSION INTRA-ARTICULAR; INTRALESIONAL; INTRAMUSCULAR; SOFT TISSUE at 07:59

## 2022-02-11 RX ADMIN — LIDOCAINE HYDROCHLORIDE 0.5 ML: 10 INJECTION, SOLUTION INFILTRATION; PERINEURAL at 07:59

## 2022-02-11 NOTE — PROGRESS NOTES
ASSESSMENT/PLAN:    Assessment:   Thumb CMC Arthritis  left    Plan:   Patient will proceed with a left thumb CMC jt steroid injection with depo today  He has no formal restrictions  Follow Up:  3  month(s)    To Do Next Visit:       General Discussions:     CMC Arthritis: The anatomy and physiology of carpometacarpal joint arthritis was discussed with the patient today in the office  Deterioration of the articular cartilage eventually leads to hypermobility at the thumb [facility] [address] joint, resulting in joint subluxation, osteophyte formation, cystic changes within the trapezium and base of the first metacarpal, as well as subchondral sclerosis  Eventually, pain, limited mobility, and compensatory hyperextension at the metacarpophalangeal joint may develop  While normal activity and usage of the thumb joint may provide a painful experience to the patient, this typically does not result in damage to the thumb or hand  Treatment options include resting thumb spica splints to decreased joint edema, pain, and inflammation  Therapy exercises to strengthen the thenar musculature may relieve pain, but do not alter the overall continued development of osteoarthritis  Oral medications, topical medications, corticosteroid injections may decrease pain and increase overall function  Eventually, approximately 5% of patients may require surgical intervention  Operative Discussions:       _____________________________________________________  CHIEF COMPLAINT:  Chief Complaint   Patient presents with    Left Thumb - Follow-up     [facility]- Depo          SUBJECTIVE:  René Johnson is a 51 y o  male who presents for follow up regarding Thumb CMC Arthritis  left  Since last visit, René Johnson has tried steroid injection with depo with only partial relief  Today there is Pain  Moderate  Intermittant  Sharp and Aching to the left thumb      Radiation: Yes to the  thumb  Associated symptoms: No Complaints    PAST MEDICAL HISTORY:  Past Medical History:   Diagnosis Date    Acute pain of right knee 9/10/2019       PAST SURGICAL HISTORY:  Past Surgical History:   Procedure Laterality Date    CONDYLOMA EXCISION/FULGURATION      HERNIA REPAIR Left 2016    WISDOM TOOTH EXTRACTION  2014       FAMILY HISTORY:  Family History   Problem Relation Age of Onset    No Known Problems Mother     Diabetes Paternal Grandfather         mellitus     Diabetes Paternal Uncle         mellitus        SOCIAL HISTORY:  Social History     Tobacco Use    Smoking status: Current Every Day Smoker     Packs/day: 0 25    Smokeless tobacco: Never Used    Tobacco comment: 3 packs per week   Substance Use Topics    Alcohol use: Yes     Comment: drinks 6 drinks per week    Drug use: Yes     Frequency: 3 0 times per week     Types: Marijuana     Comment: denies IVDA       MEDICATIONS:    Current Outpatient Medications:     diclofenac sodium (VOLTAREN) 1 %, Apply 2 g topically 4 (four) times a day, Disp: 100 g, Rfl: 0    loratadine (CLARITIN) 10 mg tablet, Take 10 mg by mouth, Disp: , Rfl:     meloxicam (MOBIC) 7 5 mg tablet, TAKE 1 TABLET BY MOUTH DAILY, Disp: 30 tablet, Rfl: 2    Multiple Vitamin (MULTIVITAMINS PO), Take by mouth, Disp: , Rfl:     Omega-3 Fatty Acids (FISH OIL) 645 MG CAPS, Take by mouth, Disp: , Rfl:     podofilox (CONDYLOX) 0 5 % gel, Apply topically 2 (two) times a day Apply for 3 days followed by three days of no therapy  Repeat for 4 cycles, Disp: 3 5 g, Rfl: 0    polyethylene glycol (GLYCOLAX) 17 GM/SCOOP powder, At 5pm take 5mgx2 dulcolax  At 6pm 32oz miralax in 64oz gatorade  Drink 8oz glass every 5 mins until 32oz finished  Drink remaining as rec , Disp: 238 g, Rfl: 0    Sulfamethoxazole Micro POWD, , Disp: , Rfl:     ALLERGIES:  No Known Allergies    REVIEW OF SYSTEMS:  Pertinent items are noted in HPI      LABS:  HgA1c:   Lab Results   Component Value Date    HGBA1C 5 4 09/05/2019     BMP: No results found for: GLUCOSE, CALCIUM, NA, K, CO2, CL, BUN, CREATININE        _____________________________________________________  PHYSICAL EXAMINATION:  Vital signs: /71   Pulse 78   Ht 6' 2" (1 88 m)   Wt 93 2 kg (205 lb 6 4 oz)   BMI 26 37 kg/m²   General: well developed and well nourished, alert, oriented times 3 and appears comfortable  Psychiatric: Normal  HEENT: Trachea Midline, No torticollis  Cardiovascular: No discernable arrhythmia  Pulmonary: No wheezing or stridor  Abdomen: No rebound or guarding  Extremities: No peripheral edema  Skin: No masses, erythema, lacerations, fluctation, ulcerations  Neurovascular: Sensation Intact to the Median, Ulnar, Radial Nerve, Motor Intact to the Median, Ulnar, Radial Nerve and Pulses Intact    MUSCULOSKELETAL EXAMINATION:  LEFT SIDE:  CMC: No Instability, Positive tendnerness CMC and Positive Shoulder Sign    _____________________________________________________  STUDIES REVIEWED:  No Studies to review      PROCEDURES PERFORMED:  Small joint arthrocentesis: L thumb CMC  [place] Protocol:  Consent: Verbal consent obtained    Risks and benefits: risks, benefits and alternatives were discussed  Consent given by: patient  Site marked: the operative site was marked  Supporting Documentation  Indications: pain   Procedure Details  Location: thumb - L thumb CMC  Medications administered: 0 5 mL lidocaine 1 %; 0 5 mL methylPREDNISolone acetate 40 mg/mL    Patient tolerance: patient tolerated the procedure well with no immediate complications  Dressing:  Sterile dressing applied            Scribe Attestation    I,:  Ghada Aponte am acting as a scribe while in the presence of the attending physician :       I,:  Leopold Mitts, MD personally performed the services described in this documentation    as scribed in my presence :

## 2022-03-02 ENCOUNTER — TELEPHONE (OUTPATIENT)
Dept: GASTROENTEROLOGY | Facility: CLINIC | Age: 47
End: 2022-03-02

## 2022-03-02 NOTE — TELEPHONE ENCOUNTER
VM from pt to r/s colon with Dr Paddy Campbell at [facility]  TC to pt  Scheduled date of colonoscopy (as of today):  300625    Physician performing colonoscopy:  Dr Paddy Campbell  Location of colonoscopy:   [facility]  Bowel prep reviewed with patient:  Dulcolax/Miralax  Instructions reviewed with patient by:  Joyce Riley pt retains instructions  Clearances:  n/a

## 2022-04-26 ENCOUNTER — ANESTHESIA (OUTPATIENT)
Dept: ANESTHESIOLOGY | Facility: HOSPITAL | Age: 47
End: 2022-04-26

## 2022-04-26 ENCOUNTER — ANESTHESIA EVENT (OUTPATIENT)
Dept: ANESTHESIOLOGY | Facility: HOSPITAL | Age: 47
End: 2022-04-26

## 2022-05-10 ENCOUNTER — HOSPITAL ENCOUNTER (OUTPATIENT)
Dept: GASTROENTEROLOGY | Facility: AMBULARY SURGERY CENTER | Age: 47
Setting detail: OUTPATIENT SURGERY
Discharge: HOME | End: 2022-05-10
Attending: INTERNAL MEDICINE | Admitting: INTERNAL MEDICINE
Payer: COMMERCIAL

## 2022-05-10 ENCOUNTER — ANESTHESIA EVENT (OUTPATIENT)
Dept: GASTROENTEROLOGY | Facility: AMBULARY SURGERY CENTER | Age: 47
End: 2022-05-10

## 2022-05-10 ENCOUNTER — ANESTHESIA (OUTPATIENT)
Dept: GASTROENTEROLOGY | Facility: AMBULARY SURGERY CENTER | Age: 47
End: 2022-05-10

## 2022-05-10 VITALS
WEIGHT: 200 LBS | RESPIRATION RATE: 18 BRPM | DIASTOLIC BLOOD PRESSURE: 77 MMHG | TEMPERATURE: 97.8 F | BODY MASS INDEX: 25.67 KG/M2 | HEIGHT: 74 IN | SYSTOLIC BLOOD PRESSURE: 119 MMHG | HEART RATE: 55 BPM | OXYGEN SATURATION: 99 %

## 2022-05-10 DIAGNOSIS — Z12.11 SCREENING FOR COLORECTAL CANCER: ICD-10-CM

## 2022-05-10 DIAGNOSIS — Z12.12 SCREENING FOR COLORECTAL CANCER: ICD-10-CM

## 2022-05-10 PROBLEM — F17.200 SMOKING: Status: ACTIVE | Noted: 2022-05-10

## 2022-05-10 PROBLEM — IMO0001 SMOKING: Status: ACTIVE | Noted: 2022-05-10

## 2022-05-10 PROCEDURE — G0121 COLON CA SCRN NOT HI RSK IND: HCPCS | Performed by: INTERNAL MEDICINE

## 2022-05-10 RX ORDER — LIDOCAINE HYDROCHLORIDE 10 MG/ML
INJECTION, SOLUTION EPIDURAL; INFILTRATION; INTRACAUDAL; PERINEURAL AS NEEDED
Status: DISCONTINUED | OUTPATIENT
Start: 2022-05-10 | End: 2022-05-10

## 2022-05-10 RX ORDER — PROPOFOL 10 MG/ML
INJECTION, EMULSION INTRAVENOUS CONTINUOUS PRN
Status: DISCONTINUED | OUTPATIENT
Start: 2022-05-10 | End: 2022-05-10

## 2022-05-10 RX ORDER — PROPOFOL 10 MG/ML
INJECTION, EMULSION INTRAVENOUS AS NEEDED
Status: DISCONTINUED | OUTPATIENT
Start: 2022-05-10 | End: 2022-05-10

## 2022-05-10 RX ORDER — SODIUM CHLORIDE, SODIUM LACTATE, POTASSIUM CHLORIDE, CALCIUM CHLORIDE 600; 310; 30; 20 MG/100ML; MG/100ML; MG/100ML; MG/100ML
INJECTION, SOLUTION INTRAVENOUS CONTINUOUS PRN
Status: DISCONTINUED | OUTPATIENT
Start: 2022-05-10 | End: 2022-05-10

## 2022-05-10 RX ORDER — SODIUM CHLORIDE, SODIUM LACTATE, POTASSIUM CHLORIDE, CALCIUM CHLORIDE 600; 310; 30; 20 MG/100ML; MG/100ML; MG/100ML; MG/100ML
100 INJECTION, SOLUTION INTRAVENOUS CONTINUOUS
Status: DISCONTINUED | OUTPATIENT
Start: 2022-05-10 | End: 2022-05-14 | Stop reason: HOSPADM

## 2022-05-10 RX ADMIN — PROPOFOL 80 MG: 10 INJECTION, EMULSION INTRAVENOUS at 12:22

## 2022-05-10 RX ADMIN — PROPOFOL 20 MG: 10 INJECTION, EMULSION INTRAVENOUS at 12:28

## 2022-05-10 RX ADMIN — PROPOFOL 120 MCG/KG/MIN: 10 INJECTION, EMULSION INTRAVENOUS at 12:22

## 2022-05-10 RX ADMIN — LIDOCAINE HYDROCHLORIDE 50 MG: 10 INJECTION, SOLUTION EPIDURAL; INFILTRATION; INTRACAUDAL at 12:22

## 2022-05-10 RX ADMIN — SODIUM CHLORIDE, SODIUM LACTATE, POTASSIUM CHLORIDE, AND CALCIUM CHLORIDE: .6; .31; .03; .02 INJECTION, SOLUTION INTRAVENOUS at 12:06

## 2022-05-10 RX ADMIN — PROPOFOL 20 MG: 10 INJECTION, EMULSION INTRAVENOUS at 12:23

## 2022-05-10 NOTE — ANESTHESIA PREPROCEDURE EVALUATION
Procedure:  COLONOSCOPY    Relevant Problems   ANESTHESIA (within normal limits)      CARDIO (within normal limits)      PULMONARY  marijuana use   (+) Smoking   (-) Sleep apnea   (-) URI (upper respiratory infection)      Physical Exam    Airway    Mallampati score: II  TM Distance: >3 FB  Neck ROM: full     Dental   No notable dental hx     Cardiovascular      Pulmonary      Other Findings       No recent labs     Anesthesia Plan  ASA Score- 2     Anesthesia Type- IV sedation with anesthesia with ASA Monitors  Additional Monitors:   Airway Plan:           Plan Factors-Exercise tolerance (METS): >4 METS  Chart reviewed  Existing labs reviewed  Patient summary reviewed  Patient is a current smoker  Patient smoked on day of surgery  Induction- intravenous  Postoperative Plan-     Informed Consent- Anesthetic plan and risks discussed with patient and spouse  I personally reviewed this patient with the CRNA  Discussed and agreed on the Anesthesia Plan with the CRNA  Forrest Naqvi

## 2022-05-10 NOTE — H&P
History and Physical - [facility]  Elvia Eddy 51 y o  male MRN: [id number]                  HPI: Elvia Eddy is a 51y o  year old male who presents for colonoscopy      REVIEW OF SYSTEMS: Per the HPI, and otherwise unremarkable  Historical Information   Past Medical History:   Diagnosis Date    Acute pain of right knee 9/10/2019     Past Surgical History:   Procedure Laterality Date    CONDYLOMA EXCISION/FULGURATION      HERNIA REPAIR Left 2016    WISDOM TOOTH EXTRACTION  2014     Social History   Social History     Substance and Sexual Activity   Alcohol Use Yes    Comment: drinks 6 drinks per week     Social History     Substance and Sexual Activity   Drug Use Yes    Frequency: 3 0 times per week    Types: Marijuana    Comment: denies IVDA     Social History     Tobacco Use   Smoking Status Current Every Day Smoker    Packs/day: 0 25   Smokeless Tobacco Never Used   Tobacco Comment    3 packs per week     Family History   Problem Relation Age of Onset    No Known Problems Mother     Diabetes Paternal Grandfather         mellitus     Diabetes Paternal Uncle         mellitus        Meds/Allergies       Current Outpatient Medications:     diclofenac sodium (VOLTAREN) 1 %    loratadine (CLARITIN) 10 mg tablet    meloxicam (MOBIC) 7 5 mg tablet    Multiple Vitamin (MULTIVITAMINS PO)    Omega-3 Fatty Acids (FISH OIL) 645 MG CAPS    podofilox (CONDYLOX) 0 5 % gel    polyethylene glycol (GLYCOLAX) 17 GM/SCOOP powder    Current Facility-Administered Medications:     lactated ringers infusion, 100 mL/hr, Intravenous, Continuous    No Known Allergies    Objective     There were no vitals taken for this visit  PHYSICAL EXAM    Gen: NAD  Head: NCAT  CV: RRR  CHEST: Clear  ABD: soft, NT/ND  EXT: no edema      ASSESSMENT/PLAN:  This is a 51y o  year old male here for colonoscopy, and he is stable and optimized for his procedure

## 2022-05-10 NOTE — ANESTHESIA POSTPROCEDURE EVALUATION
Post-Op Assessment Note    CV Status:  Stable  Pain Score: 0    Pain management: adequate     Mental Status:  Alert and awake   Hydration Status:  Euvolemic   PONV Controlled:  Controlled   Airway Patency:  Patent      Post Op Vitals Reviewed: Yes      Staff: CRNA         No complications documented      BP   112/63   Temp     Pulse  70   Resp   17   SpO2   99%

## 2022-07-15 ENCOUNTER — OFFICE VISIT (OUTPATIENT)
Dept: OBGYN CLINIC | Facility: HOSPITAL | Age: 47
End: 2022-07-15
Payer: COMMERCIAL

## 2022-07-15 VITALS
DIASTOLIC BLOOD PRESSURE: 69 MMHG | SYSTOLIC BLOOD PRESSURE: 108 MMHG | WEIGHT: 198.8 LBS | HEART RATE: 66 BPM | BODY MASS INDEX: 25.52 KG/M2

## 2022-07-15 DIAGNOSIS — M18.12 PRIMARY OSTEOARTHRITIS OF FIRST CARPOMETACARPAL JOINT OF LEFT HAND: Primary | ICD-10-CM

## 2022-07-15 PROCEDURE — 99213 OFFICE O/P EST LOW 20 MIN: CPT | Performed by: PHYSICIAN ASSISTANT

## 2022-07-15 PROCEDURE — 20600 DRAIN/INJ JOINT/BURSA W/O US: CPT | Performed by: PHYSICIAN ASSISTANT

## 2022-07-15 RX ADMIN — LIDOCAINE HYDROCHLORIDE 0.5 ML: 10 INJECTION, SOLUTION INFILTRATION; PERINEURAL at 07:59

## 2022-07-15 RX ADMIN — METHYLPREDNISOLONE ACETATE 0.5 ML: 40 INJECTION, SUSPENSION INTRA-ARTICULAR; INTRALESIONAL; INTRAMUSCULAR; SOFT TISSUE at 07:59

## 2022-07-15 NOTE — PROGRESS NOTES
ASSESSMENT/PLAN:    Assessment:   Thumb CMC Arthritis  left    Plan:   Patient will proceed with a left thumb CMC jt steroid injection with depo today  He has no formal restrictions  Follow Up:  3  month(s)    To Do Next Visit:       General Discussions:     CMC Arthritis: The anatomy and physiology of carpometacarpal joint arthritis was discussed with the patient today in the office  Deterioration of the articular cartilage eventually leads to hypermobility at the thumb [facility] joint, resulting in joint subluxation, osteophyte formation, cystic changes within the trapezium and base of the first metacarpal, as well as subchondral sclerosis  Eventually, pain, limited mobility, and compensatory hyperextension at the metacarpophalangeal joint may develop  While normal activity and usage of the thumb joint may provide a painful experience to the patient, this typically does not result in damage to the thumb or hand  Treatment options include resting thumb spica splints to decreased joint edema, pain, and inflammation  Therapy exercises to strengthen the thenar musculature may relieve pain, but do not alter the overall continued development of osteoarthritis  Oral medications, topical medications, corticosteroid injections may decrease pain and increase overall function  Eventually, approximately 5% of patients may require surgical intervention  Operative Discussions:       _____________________________________________________  CHIEF COMPLAINT:  Chief Complaint   Patient presents with    Left Thumb - Follow-up     [facility]- Depo          SUBJECTIVE:  Pratik Joshi is a 49 y o  male who presents for follow up regarding Thumb CMC Arthritis  left  Since last visit, Pratik Joshi has tried steroid injections with only partial relief  Today there is Pain  Moderate  Intermittant  Sharp and Aching to the left thumb      Radiation: Yes to the  thumb  Associated symptoms: No Complaints    PAST MEDICAL HISTORY:  Past Medical History:   Diagnosis Date    Acute pain of right knee 9/10/2019       PAST SURGICAL HISTORY:  Past Surgical History:   Procedure Laterality Date    CONDYLOMA EXCISION/FULGURATION      HERNIA REPAIR Left 2016    WISDOM TOOTH EXTRACTION  2014       FAMILY HISTORY:  Family History   Problem Relation Age of Onset    No Known Problems Mother     Diabetes Paternal Grandfather         mellitus     Diabetes Paternal Uncle         mellitus        SOCIAL HISTORY:  Social History     Tobacco Use    Smoking status: Current Every Day Smoker     Packs/day: 0 25    Smokeless tobacco: Never Used    Tobacco comment: 3 packs per week   Vaping Use    Vaping Use: Never used   Substance Use Topics    Alcohol use: Yes     Comment: drinks 6 drinks per week    Drug use: Yes     Frequency: 3 0 times per week     Types: Marijuana     Comment: denies IVDA       MEDICATIONS:    Current Outpatient Medications:     diclofenac sodium (VOLTAREN) 1 %, Apply 2 g topically 4 (four) times a day, Disp: 100 g, Rfl: 0    loratadine (CLARITIN) 10 mg tablet, Take 10 mg by mouth, Disp: , Rfl:     meloxicam (MOBIC) 7 5 mg tablet, TAKE 1 TABLET BY MOUTH DAILY, Disp: 30 tablet, Rfl: 2    Multiple Vitamin (MULTIVITAMINS PO), Take by mouth, Disp: , Rfl:     Omega-3 Fatty Acids (FISH OIL) 645 MG CAPS, Take by mouth, Disp: , Rfl:     podofilox (CONDYLOX) 0 5 % gel, Apply topically 2 (two) times a day Apply for 3 days followed by three days of no therapy  Repeat for 4 cycles, Disp: 3 5 g, Rfl: 0    polyethylene glycol (GLYCOLAX) 17 GM/SCOOP powder, At 5pm take 5mgx2 dulcolax  At 6pm 32oz miralax in 64oz gatorade  Drink 8oz glass every 5 mins until 32oz finished  Drink remaining as rec , Disp: 238 g, Rfl: 0    ALLERGIES:  No Known Allergies    REVIEW OF SYSTEMS:  Pertinent items are noted in HPI      LABS:  HgA1c:   Lab Results   Component Value Date    HGBA1C 5 4 09/05/2019     BMP: No results found for: GLUCOSE, CALCIUM, NA, K, CO2, CL, BUN, CREATININE        _____________________________________________________  PHYSICAL EXAMINATION:  Vital signs: /69   Pulse 66   Wt 90 2 kg (198 lb 12 8 oz)   BMI 25 52 kg/m²   General: well developed and well nourished, alert, oriented times 3 and appears comfortable  Psychiatric: Normal  HEENT: Trachea Midline, No torticollis  Cardiovascular: No discernable arrhythmia  Pulmonary: No wheezing or stridor  Abdomen: No rebound or guarding  Extremities: No peripheral edema  Skin: No masses, erythema, lacerations, fluctation, ulcerations  Neurovascular: Sensation Intact to the Median, Ulnar, Radial Nerve, Motor Intact to the Median, Ulnar, Radial Nerve and Pulses Intact    MUSCULOSKELETAL EXAMINATION:  LEFT SIDE:  CMC: No Instability, Positive tendnerness CMC and Positive Shoulder Sign    _____________________________________________________  STUDIES REVIEWED:  No Studies to review      PROCEDURES PERFORMED:  Small joint arthrocentesis: L thumb CMC  [place] Protocol:  Consent: Verbal consent obtained    Risks and benefits: risks, benefits and alternatives were discussed  Consent given by: patient  Site marked: the operative site was marked  Supporting Documentation  Indications: pain   Procedure Details  Location: thumb - L thumb CMC  Medications administered: 0 5 mL lidocaine 1 %; 0 5 mL methylPREDNISolone acetate 40 mg/mL    Patient tolerance: patient tolerated the procedure well with no immediate complications  Dressing:  Sterile dressing applied

## 2022-07-15 NOTE — PATIENT INSTRUCTIONS

## 2022-07-18 RX ORDER — METHYLPREDNISOLONE ACETATE 40 MG/ML
0.5 INJECTION, SUSPENSION INTRA-ARTICULAR; INTRALESIONAL; INTRAMUSCULAR; SOFT TISSUE
Status: COMPLETED | OUTPATIENT
Start: 2022-07-15 | End: 2022-07-15

## 2022-07-18 RX ORDER — LIDOCAINE HYDROCHLORIDE 10 MG/ML
0.5 INJECTION, SOLUTION INFILTRATION; PERINEURAL
Status: COMPLETED | OUTPATIENT
Start: 2022-07-15 | End: 2022-07-15

## 2022-07-22 ENCOUNTER — RA CDI HCC (OUTPATIENT)
Dept: OTHER | Facility: HOSPITAL | Age: 47
End: 2022-07-22

## 2022-07-22 NOTE — PROGRESS NOTES
[facility]  [facility]       Chart reviewed, no opportunity found: CHART REVIEWED, NO OPPORTUNITY FOUND        Patients Insurance        Commercial Insurance: [employer]

## 2022-10-19 ENCOUNTER — OFFICE VISIT (OUTPATIENT)
Dept: OBGYN CLINIC | Facility: HOSPITAL | Age: 47
End: 2022-10-19
Payer: COMMERCIAL

## 2022-10-19 VITALS
HEART RATE: 69 BPM | WEIGHT: 196.2 LBS | BODY MASS INDEX: 25.18 KG/M2 | SYSTOLIC BLOOD PRESSURE: 113 MMHG | DIASTOLIC BLOOD PRESSURE: 70 MMHG | HEIGHT: 74 IN

## 2022-10-19 DIAGNOSIS — M18.12 ARTHRITIS OF CARPOMETACARPAL (CMC) JOINT OF LEFT THUMB: Primary | ICD-10-CM

## 2022-10-19 PROCEDURE — 99213 OFFICE O/P EST LOW 20 MIN: CPT | Performed by: ORTHOPAEDIC SURGERY

## 2022-10-19 PROCEDURE — 20600 DRAIN/INJ JOINT/BURSA W/O US: CPT | Performed by: ORTHOPAEDIC SURGERY

## 2022-10-19 RX ORDER — BUPIVACAINE HYDROCHLORIDE 2.5 MG/ML
0.5 INJECTION, SOLUTION INFILTRATION; PERINEURAL
Status: COMPLETED | OUTPATIENT
Start: 2022-10-19 | End: 2022-10-19

## 2022-10-19 RX ORDER — METHYLPREDNISOLONE ACETATE 40 MG/ML
0.5 INJECTION, SUSPENSION INTRA-ARTICULAR; INTRALESIONAL; INTRAMUSCULAR; SOFT TISSUE
Status: COMPLETED | OUTPATIENT
Start: 2022-10-19 | End: 2022-10-19

## 2022-10-19 RX ADMIN — BUPIVACAINE HYDROCHLORIDE 0.5 ML: 2.5 INJECTION, SOLUTION INFILTRATION; PERINEURAL at 09:06

## 2022-10-19 RX ADMIN — METHYLPREDNISOLONE ACETATE 0.5 ML: 40 INJECTION, SUSPENSION INTRA-ARTICULAR; INTRALESIONAL; INTRAMUSCULAR; SOFT TISSUE at 09:06

## 2022-10-19 NOTE — PROGRESS NOTES
ASSESSMENT/PLAN:    Assessment:   Thumb CMC Arthritis  left    Plan:   Resume activities as tolerated  Use comfort cool brace as needed  OTC meds, ice as needed    Follow Up:  3  month(s)    To Do Next Visit:  Reexamination    General Discussions:     CMC Arthritis: The anatomy and physiology of carpometacarpal joint arthritis was discussed with the patient today in the office  Deterioration of the articular cartilage eventually leads to hypermobility at the thumb [facility] joint, resulting in joint subluxation, osteophyte formation, cystic changes within the trapezium and base of the first metacarpal, as well as subchondral sclerosis  Eventually, pain, limited mobility, and compensatory hyperextension at the metacarpophalangeal joint may develop  While normal activity and usage of the thumb joint may provide a painful experience to the patient, this typically does not result in damage to the thumb or hand  Treatment options include resting thumb spica splints to decreased joint edema, pain, and inflammation  Therapy exercises to strengthen the thenar musculature may relieve pain, but do not alter the overall continued development of osteoarthritis  Oral medications, topical medications, corticosteroid injections may decrease pain and increase overall function  Eventually, approximately 5% of patients may require surgical intervention  Operative Discussions:       _____________________________________________________  CHIEF COMPLAINT:  Chief Complaint   Patient presents with   • Left Thumb - Follow-up, [facility]     Depo given          SUBJECTIVE:  Elvis Nina is a 49 y o  male who presents for follow up regarding Arthritis of the Left Thumb CMC joint  Since last visit, Elvis Nina has tried steroid injections and Tylenol with only partial relief  Today there is Pain  Moderate  Intermittant  Dull and Aching to the left thumb      Radiation: None  Associated symptoms: None  Handedness: right  Work status:     PAST MEDICAL HISTORY:  Past Medical History:   Diagnosis Date   • Acute pain of right knee 9/10/2019       PAST SURGICAL HISTORY:  Past Surgical History:   Procedure Laterality Date   • CONDYLOMA EXCISION/FULGURATION     • HERNIA REPAIR Left 2016   • WISDOM TOOTH EXTRACTION  2014       FAMILY HISTORY:  Family History   Problem Relation Age of Onset   • No Known Problems Mother    • Diabetes Paternal Grandfather         mellitus    • Diabetes Paternal Uncle         mellitus        SOCIAL HISTORY:  Social History     Tobacco Use   • Smoking status: Current Every Day Smoker     Packs/day: 0 25   • Smokeless tobacco: Never Used   • Tobacco comment: 3 packs per week   Vaping Use   • Vaping Use: Never used   Substance Use Topics   • Alcohol use: Yes     Comment: drinks 6 drinks per week   • Drug use: Yes     Frequency: 3 0 times per week     Types: Marijuana     Comment: denies IVDA       MEDICATIONS:    Current Outpatient Medications:   •  diclofenac sodium (VOLTAREN) 1 %, Apply 2 g topically 4 (four) times a day, Disp: 100 g, Rfl: 0  •  loratadine (CLARITIN) 10 mg tablet, Take 10 mg by mouth, Disp: , Rfl:   •  meloxicam (MOBIC) 7 5 mg tablet, TAKE 1 TABLET BY MOUTH DAILY, Disp: 30 tablet, Rfl: 2  •  Multiple Vitamin (MULTIVITAMINS PO), Take by mouth, Disp: , Rfl:   •  Omega-3 Fatty Acids (FISH OIL) 645 MG CAPS, Take by mouth, Disp: , Rfl:   •  podofilox (CONDYLOX) 0 5 % gel, Apply topically 2 (two) times a day Apply for 3 days followed by three days of no therapy  Repeat for 4 cycles, Disp: 3 5 g, Rfl: 0  •  polyethylene glycol (GLYCOLAX) 17 GM/SCOOP powder, At 5pm take 5mgx2 dulcolax  At 6pm 32oz miralax in 64oz gatorade  Drink 8oz glass every 5 mins until 32oz finished  Drink remaining as rec , Disp: 238 g, Rfl: 0    ALLERGIES:  No Known Allergies    REVIEW OF SYSTEMS:  Pertinent items are noted in HPI  A comprehensive review of systems was negative      LABS:  HgA1c:   Lab Results   Component Value Date HGBA1C 5 4 09/05/2019     BMP: No results found for: GLUCOSE, CALCIUM, NA, K, CO2, CL, BUN, CREATININE        _____________________________________________________  PHYSICAL EXAMINATION:  Vital signs: /70   Pulse 69   Ht 6' 2" (1 88 m)   Wt 89 kg (196 lb 3 2 oz)   BMI 25 19 kg/m²   General: well developed and well nourished, alert, oriented times 3 and appears comfortable  Psychiatric: Normal  HEENT: Trachea Midline, No torticollis  Cardiovascular: No discernable arrhythmia  Pulmonary: No wheezing or stridor  Abdomen: No rebound or guarding  Extremities: No peripheral edema  Skin: No masses, erythema, lacerations, fluctation, ulcerations  Neurovascular: Sensation Intact to the Median, Ulnar, Radial Nerve, Motor Intact to the Median, Ulnar, Radial Nerve and Pulses Intact    MUSCULOSKELETAL EXAMINATION:  LEFT SIDE:  CMC: Negative Shoulder Sign, No Instability, Positive grind and Positive tendnerness CMC    _____________________________________________________  STUDIES REVIEWED:  No Studies to review      PROCEDURES PERFORMED:  Small joint arthrocentesis: L thumb CMC  [place] Protocol:  Consent: Verbal consent obtained  Risks and benefits: risks, benefits and alternatives were discussed  Consent given by: patient  Time out: Immediately prior to procedure a "time out" was called to verify the correct patient, procedure, equipment, support staff and site/side marked as required    Timeout called at: 10/19/2022 9:05 AM   Patient understanding: patient states understanding of the procedure being performed  Patient identity confirmed: verbally with patient    Supporting Documentation  Indications: pain   Procedure Details  Location: thumb - L thumb CMC  Needle size: 25 G  Ultrasound guidance: no  Approach: volar  Medications administered: 0 5 mL bupivacaine 0 25 %; 0 5 mL methylPREDNISolone acetate 40 mg/mL    Patient tolerance: patient tolerated the procedure well with no immediate complications  Dressing: Sterile dressing applied                 I interviewed, took the history and examined the patient  I discussed the case with the Resident and reviewed the Resident's note  I supervised the Resident and I agree with the Resident management plan as it was presented to me  I ws present in the clinic and examined the patient

## 2023-01-25 ENCOUNTER — OFFICE VISIT (OUTPATIENT)
Dept: OBGYN CLINIC | Facility: HOSPITAL | Age: 48
End: 2023-01-25

## 2023-01-25 VITALS
BODY MASS INDEX: 24.95 KG/M2 | DIASTOLIC BLOOD PRESSURE: 76 MMHG | HEART RATE: 62 BPM | HEIGHT: 74 IN | WEIGHT: 194.4 LBS | SYSTOLIC BLOOD PRESSURE: 119 MMHG

## 2023-01-25 DIAGNOSIS — M18.12 PRIMARY OSTEOARTHRITIS OF FIRST CARPOMETACARPAL JOINT OF LEFT HAND: ICD-10-CM

## 2023-01-25 RX ORDER — MELOXICAM 7.5 MG/1
7.5 TABLET ORAL DAILY
Qty: 90 TABLET | Refills: 3 | Status: SHIPPED | OUTPATIENT
Start: 2023-01-25

## 2023-01-25 RX ORDER — LIDOCAINE HYDROCHLORIDE 10 MG/ML
0.5 INJECTION, SOLUTION INFILTRATION; PERINEURAL
Status: COMPLETED | OUTPATIENT
Start: 2023-01-25 | End: 2023-01-25

## 2023-01-25 RX ORDER — METHYLPREDNISOLONE ACETATE 40 MG/ML
0.5 INJECTION, SUSPENSION INTRA-ARTICULAR; INTRALESIONAL; INTRAMUSCULAR; SOFT TISSUE
Status: COMPLETED | OUTPATIENT
Start: 2023-01-25 | End: 2023-01-25

## 2023-01-25 RX ADMIN — LIDOCAINE HYDROCHLORIDE 0.5 ML: 10 INJECTION, SOLUTION INFILTRATION; PERINEURAL at 08:58

## 2023-01-25 RX ADMIN — METHYLPREDNISOLONE ACETATE 0.5 ML: 40 INJECTION, SUSPENSION INTRA-ARTICULAR; INTRALESIONAL; INTRAMUSCULAR; SOFT TISSUE at 08:58

## 2023-01-25 NOTE — PROGRESS NOTES
ASSESSMENT/PLAN:    Assessment:   Left thumb CMC arthritis    Plan:   Patient was given a left thumb CMC cortisone injection with Depo-Medrol  He tolerated well  Patient was advised that he can repeat the cortisone injections every 3 to 4 months as needed for pain  He was given a refill on his meloxicam  He will follow-up in 3 months for reevaluation    Follow Up:  3 months    To Do Next Visit:  Reevaluation    General Discussions:  [facility] Arthritis: The anatomy and physiology of carpometacarpal joint arthritis was discussed with the patient today in the office  Deterioration of the articular cartilage eventually leads to hypermobility at the thumb [facility] joint, resulting in joint subluxation, osteophyte formation, cystic changes within the trapezium and base of the first metacarpal, as well as subchondral sclerosis  Eventually, pain, limited mobility, and compensatory hyperextension at the metacarpophalangeal joint may develop  While normal activity and usage of the thumb joint may provide a painful experience to the patient, this typically does not result in damage to the thumb or hand  Treatment options include resting thumb spica splints to decreased joint edema, pain, and inflammation  Therapy exercises to strengthen the thenar musculature may relieve pain, but do not alter the overall continued development of osteoarthritis  Oral medications, topical medications, corticosteroid injections may decrease pain and increase overall function  Eventually, approximately 5% of patients may require surgical intervention  _____________________________________________________  CHIEF COMPLAINT:  Chief Complaint   Patient presents with   • Left Thumb - Follow-up     [facility]- Depo          SUBJECTIVE:  Nelly Reilly is a 49 y o  male who presents for follow up regarding left thumb CMC arthritis  He states that he gets about 2 months of relief after the injection    He states he would like to try a repeat cortisone injection for this issue  PAST MEDICAL HISTORY:  Past Medical History:   Diagnosis Date   • Acute pain of right knee 9/10/2019       PAST SURGICAL HISTORY:  Past Surgical History:   Procedure Laterality Date   • CONDYLOMA EXCISION/FULGURATION     • HERNIA REPAIR Left 2016   • WISDOM TOOTH EXTRACTION  2014       FAMILY HISTORY:  Family History   Problem Relation Age of Onset   • No Known Problems Mother    • Diabetes Paternal Grandfather         mellitus    • Diabetes Paternal Uncle         mellitus        SOCIAL HISTORY:  Social History     Tobacco Use   • Smoking status: Every Day     Packs/day: 0 25     Types: Cigarettes   • Smokeless tobacco: Never   • Tobacco comments:     3 packs per week   Vaping Use   • Vaping Use: Never used   Substance Use Topics   • Alcohol use: Yes     Comment: drinks 6 drinks per week   • Drug use: Yes     Frequency: 3 0 times per week     Types: Marijuana     Comment: denies IVDA       MEDICATIONS:    Current Outpatient Medications:   •  diclofenac sodium (VOLTAREN) 1 %, Apply 2 g topically 4 (four) times a day, Disp: 100 g, Rfl: 0  •  loratadine (CLARITIN) 10 mg tablet, Take 10 mg by mouth, Disp: , Rfl:   •  meloxicam (MOBIC) 7 5 mg tablet, Take 1 tablet (7 5 mg total) by mouth daily, Disp: 90 tablet, Rfl: 3  •  Multiple Vitamin (MULTIVITAMINS PO), Take by mouth, Disp: , Rfl:   •  Omega-3 Fatty Acids (FISH OIL) 645 MG CAPS, Take by mouth, Disp: , Rfl:   •  podofilox (CONDYLOX) 0 5 % gel, Apply topically 2 (two) times a day Apply for 3 days followed by three days of no therapy  Repeat for 4 cycles, Disp: 3 5 g, Rfl: 0  •  polyethylene glycol (GLYCOLAX) 17 GM/SCOOP powder, At 5pm take 5mgx2 dulcolax  At 6pm 32oz miralax in 64oz gatorade  Drink 8oz glass every 5 mins until 32oz finished  Drink remaining as rec , Disp: 238 g, Rfl: 0    ALLERGIES:  No Known Allergies    REVIEW OF SYSTEMS:  Pertinent items are noted in HPI  A comprehensive review of systems was negative      LABS:  HgA1c: Lab Results   Component Value Date    HGBA1C 5 4 09/05/2019     BMP: No results found for: GLUCOSE, CALCIUM, NA, K, CO2, CL, BUN, CREATININE        _____________________________________________________  PHYSICAL EXAMINATION:  Vital signs: /76   Pulse 62   Ht 6' 2" (1 88 m)   Wt 88 2 kg (194 lb 6 4 oz)   BMI 24 96 kg/m²   General: well developed and well nourished, alert, oriented times 3 and appears comfortable  Psychiatric: Normal  HEENT: Trachea Midline, No torticollis  Cardiovascular: No discernable arrhythmia  Pulmonary: No wheezing or stridor  Abdomen: No rebound or guarding  Extremities: No peripheral edema  Skin: No masses, erythema, lacerations, fluctation, ulcerations  Neurovascular: Sensation Intact to the Median, Ulnar, Radial Nerve, Motor Intact to the Median, Ulnar, Radial Nerve and Pulses Intact    MUSCULOSKELETAL EXAMINATION:  left CMC Exam:  No adduction contracture  No hyperextension deformity of MCP joint  Positive localized tenderness over radial and dorsal aspect of thumb (CMC joint)  Grind test is Positive for pain and Positive for crepitus  No triggering or tenderness over the A1 pulley  No pain with Finkelstein’s maneuver             _____________________________________________________  STUDIES REVIEWED:  No Studies to review      PROCEDURES PERFORMED:  Small joint arthrocentesis: L thumb CMC  [place] Protocol:  Consent: Verbal consent obtained  Risks and benefits: risks, benefits and alternatives were discussed  Consent given by: patient  Time out: Immediately prior to procedure a "time out" was called to verify the correct patient, procedure, equipment, support staff and site/side marked as required    Patient understanding: patient states understanding of the procedure being performed  Patient consent: the patient's understanding of the procedure matches consent given  Site marked: the operative site was marked  Patient identity confirmed: verbally with patient    Supporting Documentation  Indications: pain   Procedure Details  Location: thumb - L thumb CMC  Preparation: Patient was prepped and draped in the usual sterile fashion  Needle size: 25 G  Approach: dorsal  Medications administered: 0 5 mL lidocaine 1 %; 0 5 mL methylPREDNISolone acetate 40 mg/mL    Patient tolerance: patient tolerated the procedure well with no immediate complications  Dressing:  Sterile dressing applied             Scribe Attestation    I,:  Adelfo Lambert PA-C am acting as a scribe while in the presence of the attending physician :       I,:  Tra Bean MD personally performed the services described in this documentation    as scribed in my presence :

## 2023-02-02 NOTE — PROGRESS NOTES
Daily Note     Today's date: 10/24/2019  Patient name: Archie Zuniga  : 1975  MRN: [id number]  Referring provider: Garfield De La Fuente MD  Dx:   No diagnosis found  Subjective: some icnreased pain in the past week  Mostly UT and pec today  Several manuals did seem to free up the TRP and T-S stiffness  We will follow up next week to determine progress  His original complaint of pain over the  Coracoid and bicep tendon  Objective: See treatment diary below      Assessment: Tolerated treatment well  Patient demonstrated fatigue post treatment      Plan: Continue per plan of care  Precautions: R shoulder       Manual  10  3 19 10 8 10 17 10 24         graston ant shoulder through pec MJ 5min  MJ 5min  MJ5min  5min          Inf and post glides of the shoulder  MJ MJ  MJ          ISTM UT R   MJ  MJ          Cervical Sie glides  MJ         Prone gr 5 T-S mobilization     MJ          EPAT     2500 metal head small 2 0-2 6             Exercise Diary  10 8 10 17           UBE retro 4min  5min            Prone TI  10x3 #1  10x3 #1 TYI            Sleeper stertch std  10''x10  10''x10           Bicep stretch  10''x10            T-S ext foam roll  5''x10 5''x10            Post capsule stretch   With strap post glide 5''x1           scap punches  #4 20  #5 20            Levator stretch  10''x10  10''x10           S/L #3  10x3 #5 10x3            LPD/ Rows gtb 10x2  btb 10x3            IR/ER   gtb 10x2  btb 10x3            Wall walk  CW/cc otb 2x  gtb 2x            Self trp tennis ball     2min pec and prox bicep                                                                                                           Modalities
never true

## 2023-04-26 ENCOUNTER — OFFICE VISIT (OUTPATIENT)
Dept: OBGYN CLINIC | Facility: HOSPITAL | Age: 48
End: 2023-04-26

## 2023-04-26 VITALS
HEART RATE: 86 BPM | OXYGEN SATURATION: 98 % | DIASTOLIC BLOOD PRESSURE: 60 MMHG | HEIGHT: 74 IN | SYSTOLIC BLOOD PRESSURE: 101 MMHG | WEIGHT: 194.3 LBS | BODY MASS INDEX: 24.93 KG/M2

## 2023-04-26 DIAGNOSIS — M18.12 PRIMARY OSTEOARTHRITIS OF FIRST CARPOMETACARPAL JOINT OF LEFT HAND: ICD-10-CM

## 2023-04-26 RX ORDER — METHYLPREDNISOLONE ACETATE 40 MG/ML
0.5 INJECTION, SUSPENSION INTRA-ARTICULAR; INTRALESIONAL; INTRAMUSCULAR; SOFT TISSUE
Status: COMPLETED | OUTPATIENT
Start: 2023-04-26 | End: 2023-04-26

## 2023-04-26 RX ORDER — BUPIVACAINE HYDROCHLORIDE 2.5 MG/ML
0.5 INJECTION, SOLUTION INFILTRATION; PERINEURAL
Status: COMPLETED | OUTPATIENT
Start: 2023-04-26 | End: 2023-04-26

## 2023-04-26 RX ORDER — MELOXICAM 7.5 MG/1
7.5 TABLET ORAL DAILY
Qty: 90 TABLET | Refills: 3 | Status: SHIPPED | OUTPATIENT
Start: 2023-04-26

## 2023-04-26 RX ADMIN — BUPIVACAINE HYDROCHLORIDE 0.5 ML: 2.5 INJECTION, SOLUTION INFILTRATION; PERINEURAL at 09:10

## 2023-04-26 RX ADMIN — METHYLPREDNISOLONE ACETATE 0.5 ML: 40 INJECTION, SUSPENSION INTRA-ARTICULAR; INTRALESIONAL; INTRAMUSCULAR; SOFT TISSUE at 09:10

## 2023-04-26 NOTE — PROGRESS NOTES
ASSESSMENT/PLAN:    Assessment:   Left thumb CMC arthritis    Plan:   Patient was given a left thumb CMC cortisone injection today with Depo-Medrol  He tolerated well  He was advised that we can repeat the cortisone injections every 3 to 4 months as needed for pain  He was given a refill on his meloxicam  He will follow-up in 3 months for reevaluation    Follow Up:  3 months    To Do Next Visit:  Reevaluation    General Discussions:  [facility] Arthritis: The anatomy and physiology of carpometacarpal joint arthritis was discussed with the patient today in the office  Deterioration of the articular cartilage eventually leads to hypermobility at the thumb [facility] joint, resulting in joint subluxation, osteophyte formation, cystic changes within the trapezium and base of the first metacarpal, as well as subchondral sclerosis  Eventually, pain, limited mobility, and compensatory hyperextension at the metacarpophalangeal joint may develop  While normal activity and usage of the thumb joint may provide a painful experience to the patient, this typically does not result in damage to the thumb or hand  Treatment options include resting thumb spica splints to decreased joint edema, pain, and inflammation  Therapy exercises to strengthen the thenar musculature may relieve pain, but do not alter the overall continued development of osteoarthritis  Oral medications, topical medications, corticosteroid injections may decrease pain and increase overall function  Eventually, approximately 5% of patients may require surgical intervention  _____________________________________________________  CHIEF COMPLAINT:  Chief Complaint   Patient presents with   • Left Thumb - Follow-up, Swelling     CMC- Depo          SUBJECTIVE:  Edelmira Armstrong is a 49 y o  male who presents for follow-up regarding left thumb CMC arthritis  He was previously given a cortisone injection on 1/25/2023  He states that it lasted for about 2 months  He states that he would like a refill on his meloxicam at this time  He offers no other complaints  PAST MEDICAL HISTORY:  Past Medical History:   Diagnosis Date   • Acute pain of right knee 9/10/2019       PAST SURGICAL HISTORY:  Past Surgical History:   Procedure Laterality Date   • CONDYLOMA EXCISION/FULGURATION     • HERNIA REPAIR Left 2016   • WISDOM TOOTH EXTRACTION  2014       FAMILY HISTORY:  Family History   Problem Relation Age of Onset   • No Known Problems Mother    • Diabetes Paternal Grandfather         mellitus    • Diabetes Paternal Uncle         mellitus        SOCIAL HISTORY:  Social History     Tobacco Use   • Smoking status: Every Day     Packs/day: 0 25     Types: Cigarettes   • Smokeless tobacco: Never   • Tobacco comments:     3 packs per week   Vaping Use   • Vaping Use: Never used   Substance Use Topics   • Alcohol use: Yes     Comment: drinks 6 drinks per week   • Drug use: Yes     Frequency: 3 0 times per week     Types: Marijuana     Comment: denies IVDA       MEDICATIONS:    Current Outpatient Medications:   •  diclofenac sodium (VOLTAREN) 1 %, Apply 2 g topically 4 (four) times a day, Disp: 100 g, Rfl: 0  •  loratadine (CLARITIN) 10 mg tablet, Take 10 mg by mouth, Disp: , Rfl:   •  meloxicam (MOBIC) 7 5 mg tablet, Take 1 tablet (7 5 mg total) by mouth daily, Disp: 90 tablet, Rfl: 3  •  Multiple Vitamin (MULTIVITAMINS PO), Take by mouth, Disp: , Rfl:   •  Omega-3 Fatty Acids (FISH OIL) 645 MG CAPS, Take by mouth, Disp: , Rfl:   •  podofilox (CONDYLOX) 0 5 % gel, Apply topically 2 (two) times a day Apply for 3 days followed by three days of no therapy  Repeat for 4 cycles, Disp: 3 5 g, Rfl: 0  •  polyethylene glycol (GLYCOLAX) 17 GM/SCOOP powder, At 5pm take 5mgx2 dulcolax  At 6pm 32oz miralax in 64oz gatorade  Drink 8oz glass every 5 mins until 32oz finished   Drink remaining as rec , Disp: 238 g, Rfl: 0    ALLERGIES:  No Known Allergies    REVIEW OF SYSTEMS:  Pertinent items are "noted in HPI  A comprehensive review of systems was negative  LABS:  HgA1c:   Lab Results   Component Value Date    HGBA1C 5 4 09/05/2019     BMP: No results found for: GLUCOSE, CALCIUM, NA, K, CO2, CL, BUN, CREATININE    _____________________________________________________  PHYSICAL EXAMINATION:  Vital signs: /60   Pulse 86   Ht 6' 2\" (1 88 m)   Wt 88 1 kg (194 lb 4 8 oz)   SpO2 98%   BMI 24 95 kg/m²   General: well developed and well nourished, alert, oriented times 3 and appears comfortable  Psychiatric: Normal  HEENT: Trachea Midline, No torticollis  Cardiovascular: No discernable arrhythmia  Pulmonary: No wheezing or stridor  Abdomen: No rebound or guarding  Extremities: No peripheral edema  Skin: No masses, erythema, lacerations, fluctation, ulcerations  Neurovascular: Sensation Intact to the Median, Ulnar, Radial Nerve, Motor Intact to the Median, Ulnar, Radial Nerve and Pulses Intact    MUSCULOSKELETAL EXAMINATION:  left CMC Exam:  No adduction contracture  No hyperextension deformity of MCP joint  Positive localized tenderness over radial and dorsal aspect of thumb (CMC joint)  Grind test is Positive for pain and Positive for crepitus  No triggering or tenderness over the A1 pulley  No pain with Finkelstein’s maneuver       _____________________________________________________  STUDIES REVIEWED:  No Studies to review      PROCEDURES PERFORMED:  Small joint arthrocentesis: L thumb CMC  [place] Protocol:  Consent: Verbal consent obtained  Risks and benefits: risks, benefits and alternatives were discussed  Consent given by: patient  Time out: Immediately prior to procedure a \"time out\" was called to verify the correct patient, procedure, equipment, support staff and site/side marked as required    Patient understanding: patient states understanding of the procedure being performed  Patient consent: the patient's understanding of the procedure matches consent given  Site marked: the operative " site was marked  Patient identity confirmed: verbally with patient    Supporting Documentation  Indications: pain   Procedure Details  Location: thumb - L thumb CMC  Preparation: Patient was prepped and draped in the usual sterile fashion  Needle size: 25 G  Approach: dorsal  Medications administered: 0 5 mL bupivacaine 0 25 %; 0 5 mL methylPREDNISolone acetate 40 mg/mL    Patient tolerance: patient tolerated the procedure well with no immediate complications  Dressing:  Sterile dressing applied

## 2023-06-12 ENCOUNTER — TELEPHONE (OUTPATIENT)
Dept: UROLOGY | Facility: MEDICAL CENTER | Age: 48
End: 2023-06-12

## 2023-08-16 ENCOUNTER — OFFICE VISIT (OUTPATIENT)
Dept: OBGYN CLINIC | Facility: HOSPITAL | Age: 48
End: 2023-08-16
Payer: COMMERCIAL

## 2023-08-16 VITALS
SYSTOLIC BLOOD PRESSURE: 118 MMHG | WEIGHT: 195.8 LBS | BODY MASS INDEX: 25.13 KG/M2 | HEIGHT: 74 IN | DIASTOLIC BLOOD PRESSURE: 75 MMHG | HEART RATE: 66 BPM

## 2023-08-16 DIAGNOSIS — M18.12 PRIMARY OSTEOARTHRITIS OF FIRST CARPOMETACARPAL JOINT OF LEFT HAND: Primary | ICD-10-CM

## 2023-08-16 PROCEDURE — 99214 OFFICE O/P EST MOD 30 MIN: CPT | Performed by: ORTHOPAEDIC SURGERY

## 2023-08-16 PROCEDURE — 20600 DRAIN/INJ JOINT/BURSA W/O US: CPT | Performed by: ORTHOPAEDIC SURGERY

## 2023-08-16 RX ADMIN — METHYLPREDNISOLONE ACETATE 0.5 ML: 40 INJECTION, SUSPENSION INTRA-ARTICULAR; INTRALESIONAL; INTRAMUSCULAR; SOFT TISSUE at 09:00

## 2023-08-16 RX ADMIN — LIDOCAINE HYDROCHLORIDE 0.5 ML: 10 INJECTION, SOLUTION EPIDURAL; INFILTRATION; INTRACAUDAL; PERINEURAL at 09:00

## 2023-08-16 NOTE — PROGRESS NOTES
ASSESSMENT/PLAN:    Assessment:   Left thumb CMC arthritis    Plan:   Patient was given a left thumb CMC cortisone injection today with Depo-Medrol. we can repeat the cortisone injections every 3 to 4 months as needed for pain    Follow Up:  3 months    To Do Next Visit:  Reevaluation    General Discussions:  [facility] Arthritis: The anatomy and physiology of carpometacarpal joint arthritis was discussed with the patient today in the office. Deterioration of the articular cartilage eventually leads to hypermobility at the thumb [facility] joint, resulting in joint subluxation, osteophyte formation, cystic changes within the trapezium and base of the first metacarpal, as well as subchondral sclerosis. Eventually, pain, limited mobility, and compensatory hyperextension at the metacarpophalangeal joint may develop. While normal activity and usage of the thumb joint may provide a painful experience to the patient, this typically does not result in damage to the thumb or hand. Treatment options include resting thumb spica splints to decreased joint edema, pain, and inflammation. Therapy exercises to strengthen the thenar musculature may relieve pain, but do not alter the overall continued development of osteoarthritis. Oral medications, topical medications, corticosteroid injections may decrease pain and increase overall function. Eventually, approximately 5% of patients may require surgical intervention. _____________________________________________________  CHIEF COMPLAINT:  Chief Complaint   Patient presents with   • Left Thumb - Follow-up, [facility]     Depo given          SUBJECTIVE:  Ro Kennedy is a 51 y.o. male who presents for follow-up regarding left thumb CMC arthritis. He was previously given a cortisone injection on 1/25/2023. He states that it lasted for about 2 months. He states that he would like a refill on his meloxicam at this time. He offers no other complaints.     PAST MEDICAL HISTORY:  Past Medical History:   Diagnosis Date   • Acute pain of right knee 9/10/2019       PAST SURGICAL HISTORY:  Past Surgical History:   Procedure Laterality Date   • CONDYLOMA EXCISION/FULGURATION     • HERNIA REPAIR Left 2016   • WISDOM TOOTH EXTRACTION  2014       FAMILY HISTORY:  Family History   Problem Relation Age of Onset   • No Known Problems Mother    • Diabetes Paternal Grandfather         mellitus    • Diabetes Paternal Uncle         mellitus        SOCIAL HISTORY:  Social History     Tobacco Use   • Smoking status: Every Day     Packs/day: 0.25     Types: Cigarettes   • Smokeless tobacco: Never   • Tobacco comments:     3 packs per week   Vaping Use   • Vaping Use: Never used   Substance Use Topics   • Alcohol use: Yes     Comment: drinks 6 drinks per week   • Drug use: Yes     Frequency: 3.0 times per week     Types: Marijuana     Comment: denies IVDA       MEDICATIONS:    Current Outpatient Medications:   •  diclofenac sodium (VOLTAREN) 1 %, Apply 2 g topically 4 (four) times a day, Disp: 100 g, Rfl: 0  •  loratadine (CLARITIN) 10 mg tablet, Take 10 mg by mouth, Disp: , Rfl:   •  meloxicam (MOBIC) 7.5 mg tablet, Take 1 tablet (7.5 mg total) by mouth daily, Disp: 90 tablet, Rfl: 3  •  Multiple Vitamin (MULTIVITAMINS PO), Take by mouth, Disp: , Rfl:   •  Omega-3 Fatty Acids (FISH OIL) 645 MG CAPS, Take by mouth, Disp: , Rfl:   •  podofilox (CONDYLOX) 0.5 % gel, Apply topically 2 (two) times a day Apply for 3 days followed by three days of no therapy. Repeat for 4 cycles, Disp: 3.5 g, Rfl: 0  •  polyethylene glycol (GLYCOLAX) 17 GM/SCOOP powder, At 5pm take 5mgx2 dulcolax. At 6pm 32oz miralax in 64oz gatorade. Drink 8oz glass every 5 mins until 32oz finished. Drink remaining as rec., Disp: 238 g, Rfl: 0    ALLERGIES:  No Known Allergies    REVIEW OF SYSTEMS:  Pertinent items are noted in HPI. A comprehensive review of systems was negative.     LABS:  HgA1c:   Lab Results   Component Value Date    HGBA1C 5.4 procedure well with no immediate complications  Dressing:  Sterile dressing applied

## 2023-08-17 RX ORDER — LIDOCAINE HYDROCHLORIDE 10 MG/ML
0.5 INJECTION, SOLUTION EPIDURAL; INFILTRATION; INTRACAUDAL; PERINEURAL
Status: COMPLETED | OUTPATIENT
Start: 2023-08-16 | End: 2023-08-16

## 2023-08-17 RX ORDER — METHYLPREDNISOLONE ACETATE 40 MG/ML
0.5 INJECTION, SUSPENSION INTRA-ARTICULAR; INTRALESIONAL; INTRAMUSCULAR; SOFT TISSUE
Status: COMPLETED | OUTPATIENT
Start: 2023-08-16 | End: 2023-08-16

## 2023-11-22 ENCOUNTER — OFFICE VISIT (OUTPATIENT)
Dept: OBGYN CLINIC | Facility: HOSPITAL | Age: 48
End: 2023-11-22
Payer: COMMERCIAL

## 2023-11-22 VITALS
SYSTOLIC BLOOD PRESSURE: 126 MMHG | DIASTOLIC BLOOD PRESSURE: 78 MMHG | HEART RATE: 73 BPM | HEIGHT: 74 IN | WEIGHT: 200.4 LBS | BODY MASS INDEX: 25.72 KG/M2

## 2023-11-22 DIAGNOSIS — M18.12 ARTHRITIS OF CARPOMETACARPAL (CMC) JOINT OF LEFT THUMB: Primary | ICD-10-CM

## 2023-11-22 PROCEDURE — 99213 OFFICE O/P EST LOW 20 MIN: CPT | Performed by: ORTHOPAEDIC SURGERY

## 2023-11-22 PROCEDURE — 20600 DRAIN/INJ JOINT/BURSA W/O US: CPT | Performed by: ORTHOPAEDIC SURGERY

## 2023-11-22 RX ORDER — METHYLPREDNISOLONE ACETATE 40 MG/ML
0.5 INJECTION, SUSPENSION INTRA-ARTICULAR; INTRALESIONAL; INTRAMUSCULAR; SOFT TISSUE
Status: COMPLETED | OUTPATIENT
Start: 2023-11-22 | End: 2023-11-22

## 2023-11-22 RX ORDER — LIDOCAINE HYDROCHLORIDE 10 MG/ML
0.5 INJECTION, SOLUTION INFILTRATION; PERINEURAL
Status: COMPLETED | OUTPATIENT
Start: 2023-11-22 | End: 2023-11-22

## 2023-11-22 RX ADMIN — METHYLPREDNISOLONE ACETATE 0.5 ML: 40 INJECTION, SUSPENSION INTRA-ARTICULAR; INTRALESIONAL; INTRAMUSCULAR; SOFT TISSUE at 08:30

## 2023-11-22 RX ADMIN — LIDOCAINE HYDROCHLORIDE 0.5 ML: 10 INJECTION, SOLUTION INFILTRATION; PERINEURAL at 08:30

## 2023-11-22 NOTE — PROGRESS NOTES
ASSESSMENT/PLAN:    Assessment:   Thumb CMC Arthritis  left    Plan:   Diagnosis, treatment options and associated risks were discussed with the patient including no treatment, nonsurgical treatment and potential for surgical intervention. The patient was given the opportunity to ask questions regarding each. Quality of life decision to pursue elective left thumb CMC arthroplasty. Since he finds relief with intermittent injections of cortisone using Depo-Medrol he was offered, accepted, performed injection of cortisone to his left thumb CMC joint today for symptomatic relief. He tolerated the procedure well. Ice and postinjection protocol advised. Comfort Cool brace when able to do so. Follow Up:  3  month(s)    To Do Next Visit:   Repeat evaluation    General Discussions:     CMC Arthritis: The anatomy and physiology of carpometacarpal joint arthritis was discussed with the patient today in the office. Deterioration of the articular cartilage eventually leads to hypermobility at the thumb [facility], resulting in joint subluxation, osteophyte formation, cystic changes within the trapezium and base of the first metacarpal, as well as subchondral sclerosis. Eventually, pain, limited mobility, and compensatory hyperextension at the metacarpophalangeal joint may develop. While normal activity and usage of the thumb joint may provide a painful experience to the patient, this typically does not result in damage to the thumb or hand. Treatment options include resting thumb spica splints to decreased joint edema, pain, and inflammation. Therapy exercises to strengthen the thenar musculature may relieve pain, but do not alter the overall continued development of osteoarthritis. Oral medications, topical medications, corticosteroid injections may decrease pain and increase overall function. Eventually, approximately 5% of patients may require surgical intervention.        Operative Discussions:  None scheduled    _____________________________________________________  CHIEF COMPLAINT:  Chief Complaint   Patient presents with    Left Thumb - CMC     Depo-Medrol given          SUBJECTIVE:  Ivon Sanchez is a 51 y.o. male who presents for follow up regarding Thumb CMC Arthritis  left. Since last visit, Ivon Sanchez has tried steroid injections with relief. Today there is pain/ache to the left thumb.     Radiation: None, no numbness or tingling  Associated symptoms: None  Handedness: right  Work status: [employer]    PAST MEDICAL HISTORY:  Past Medical History:   Diagnosis Date    Acute pain of right knee 9/10/2019       PAST SURGICAL HISTORY:  Past Surgical History:   Procedure Laterality Date    CONDYLOMA EXCISION/FULGURATION      HERNIA REPAIR Left 2016    WISDOM TOOTH EXTRACTION  2014       FAMILY HISTORY:  Family History   Problem Relation Age of Onset    No Known Problems Mother     Diabetes Paternal Grandfather         mellitus     Diabetes Paternal Uncle         mellitus        SOCIAL HISTORY:  Social History     Tobacco Use    Smoking status: Every Day     Packs/day: 0.25     Types: Cigarettes    Smokeless tobacco: Never    Tobacco comments:     3 packs per week   Vaping Use    Vaping Use: Never used   Substance Use Topics    Alcohol use: Yes     Comment: drinks 6 drinks per week    Drug use: Yes     Frequency: 3.0 times per week     Types: Marijuana     Comment: denies IVDA       MEDICATIONS:    Current Outpatient Medications:     diclofenac sodium (VOLTAREN) 1 %, Apply 2 g topically 4 (four) times a day, Disp: 100 g, Rfl: 0    loratadine (CLARITIN) 10 mg tablet, Take 10 mg by mouth, Disp: , Rfl:     meloxicam (MOBIC) 7.5 mg tablet, Take 1 tablet (7.5 mg total) by mouth daily, Disp: 90 tablet, Rfl: 3    Multiple Vitamin (MULTIVITAMINS PO), Take by mouth, Disp: , Rfl:     Omega-3 Fatty Acids (FISH OIL) 645 MG CAPS, Take by mouth, Disp: , Rfl:     podofilox (CONDYLOX) 0.5 % gel, Apply topically 2 (two) times a day Apply for 3 days followed by three days of no therapy. Repeat for 4 cycles, Disp: 3.5 g, Rfl: 0    polyethylene glycol (GLYCOLAX) 17 GM/SCOOP powder, At 5pm take 5mgx2 dulcolax. At 6pm 32oz miralax in 64oz gatorade. Drink 8oz glass every 5 mins until 32oz finished. Drink remaining as rec., Disp: 238 g, Rfl: 0    ALLERGIES:  No Known Allergies    REVIEW OF SYSTEMS:  Pertinent items are noted in HPI. A comprehensive review of systems was negative. LABS:  HgA1c:   Lab Results   Component Value Date    HGBA1C 5.4 09/05/2019     BMP: No results found for: "GLUCOSE", "CALCIUM", "NA", "K", "CO2", "CL", "BUN", "CREATININE"        _____________________________________________________  PHYSICAL EXAMINATION:  Vital signs: /78   Pulse 73   Ht 6' 2" (1.88 m)   Wt 90.9 kg (200 lb 6.4 oz)   BMI 25.73 kg/m²   General: well developed and well nourished, alert, oriented times 3, and appears comfortable  Psychiatric: Normal  HEENT: Trachea Midline, No torticollis  Cardiovascular: No discernable arrhythmia  Pulmonary: No wheezing or stridor  Abdomen: No rebound or guarding  Extremities: No peripheral edema  Skin: No Erythema, No Lacerations, No Fluctuation, No Ulcerations  Neurovascular: Sensation Intact to the Median, Ulnar, Radial Nerve, Motor Intact to the Median, Ulnar, Radial Nerve, and Pulses Intact    MUSCULOSKELETAL EXAMINATION:  LEFT SIDE:  CMC: Positive grind and Positive tendnerness CMC, (+) shoulder sign, no hyperextension, mild swelling. No locking/triggering    _____________________________________________________  STUDIES REVIEWED:  No Studies to review      PROCEDURES PERFORMED:  Small joint arthrocentesis: L thumb CMC  [place] Protocol:  Consent: Verbal consent obtained.   Risks and benefits: risks, benefits and alternatives were discussed  Consent given by: patient  Time out: Immediately prior to procedure a "time out" was called to verify the correct patient, procedure, equipment, support staff and site/side marked as required.   Timeout called at: 11/22/2023 8:37 AM.  Patient understanding: patient states understanding of the procedure being performed  Site marked: the operative site was marked  Patient identity confirmed: verbally with patient  Supporting Documentation  Indications: pain, diagnostic evaluation and joint swelling   Procedure Details  Location: thumb - L thumb CMC  Preparation: Patient was prepped and draped in the usual sterile fashion  Needle size: 25 G  Ultrasound guidance: no  Approach: volar  Medications administered: 0.5 mL lidocaine 1 %; 0.5 mL methylPREDNISolone acetate 40 mg/mL    Patient tolerance: patient tolerated the procedure well with no immediate complications  Dressing:  Sterile dressing applied             Scribe Attestation      I,:  Isai Wright am acting as a scribe while in the presence of the attending physician.:       I,:  Fausto Recio MD personally performed the services described in this documentation    as scribed in my presence.:               Scribe Attestation      I,:  Isai Wright am acting as a scribe while in the presence of the attending physician.:       I,:  Fausto Recio MD personally performed the services described in this documentation    as scribed in my presence.:

## 2023-11-22 NOTE — PATIENT INSTRUCTIONS
Diagnosis ICD-10-CM Associated Orders   1. Arthritis of carpometacarpal Kay) joint of left thumb  M18.12 Small joint arthrocentesis: L thumb CMC        Return in about 3 months (around 2/22/2024) for re-check. What is it [facility] Arthritis? In a normal joint, cartilage covers the end of the bones and serves as a shock absorber to allow smooth, pain-free movement. In osteoarthritis (OA, or “degenerative arthritis”) the cartilage layer wears out, resulting in direct contact between the bones and producing pain and deformity. One of the most common joints to develop OA in the hand is the base of the thumb. The thumb basal joint, also called the carpometacarpal Kay) joint, is a specialized saddle shaped joint that is formed by a small bone of the wrist (trapezium) and the first bone of the thumb (metacarpal). The saddle shaped joint allows the thumb to have a wide range of motions, including up, down, across the palm, and the ability to pinch (see Figure 1). Who gets it? OA at the base of the thumb is more commonly seen in women over the age of 36. The exact cause is unknown, but genetics, previous injuries such as fractures or dislocations, and generalized joint laxity may predispose towards development of this type of arthritis. What are the symptoms and signs? The most common symptom is pain at the base of the thumb. The pain can be aggravated by activities that require pinching, such as opening jars, turning door knobs or keys, and writing. Severity can also progress to pain at rest and pain at night. In more severe cases, progressive destruction and mal-alignment of the joint occurs, and a bump develops at the base of the thumb as the metacarpal moves out of the saddle joint. This shift in the joint can cause limited motion and weakness, making pinch difficult (see Figure 2). The next joint above the [facility] may compensate by loosening, causing it to bend further back (hyperextension).            How is the diagnosis made? The diagnosis is made by history and physical evaluation. Pressure and movement such as twisting will produce pain at the joint. A grinding sensation may also be present at the joint (see Figure 3). X-rays are used to confirm the diagnosis, although symptom severity often does not correlate with x-ray findings. What are the treatment options? Less severe thumb arthritis will usually respond to non-surgical care. Arthritis medication, splinting and limited cortisone injections may help alleviate pain. A hand therapist might provide a variety of rigid and non-rigid splints which can be used while sleeping or during activities. Patients with advanced disease or who fail non-surgical treatment may be candidates for surgical reconstruction. A variety of surgical techniques are available that can successfully reduce or eliminate pain. Surgical procedures include removal of arthritic bone and joint reconstruction (arthroplasty), joint fusion, bone realignment, and even arthroscopy in select cases. A consultation with your hand surgeon can help decide the best option for you (see Figure 4). © 2012 American Society for [employer] of the Hand  www.handcare. org

## 2023-12-21 NOTE — ADDENDUM NOTE
YOUR 2 WEEK FOLLOW UP HAS BEEN SCHEDULED; IF YOU WISH TO CHANGE THE FOLLOW UP, PLEASE CALL THE [facility] AT [place]: [phone]      Steroid Joint Injection   WHAT YOU NEED TO KNOW:   A steroid joint injection is a procedure to inject steroid medicine into a joint. Steroid medicine decreases pain and inflammation. The injection may also contain an anesthetic (numbing medicine) to decrease pain. It may be done to treat conditions such as arthritis, gout, or carpal tunnel syndrome. The injections may be given in your knee, ankle, shoulder, elbow, or wrist. Injections may also be given in your hip, toe, thumb, or finger.  DISCHARGE INSTRUCTIONS:   Contact your healthcare provider if:   You have fever or chills.     You have redness or swelling at the injection site.     You have more pain than usual in your joint for more than 72 hours.     You have questions or concerns about your condition or care.    Medicines:   Pain medicine  may be given. Ask how to take this medicine safely.     Take your medicine as directed.  Contact your healthcare provider if you think your medicine is not helping or if you have side effects. Tell your provider if you are allergic to any medicine. Keep a list of the medicines, vitamins, and herbs you take. Include the amounts, and when and why you take them. Bring the list or the pill bottles to follow-up visits. Carry your medicine list with you in case of an emergency.    Self-care:   Leave the bandage on for 8 to 12 hours.  Care for your wound as directed.    Rest the area  as directed. You may need to decrease weight on certain joints, such as the knee, for a period of time. Ask when you can return to your daily activities.     Elevate  your limb where the steroid injection was given. Elevate the limb above the level of your heart as often as you can. This will help decrease swelling and pain. Prop your limb on pillows or blankets to keep it elevated comfortably.    Addended by: Kathy Young on: 3/21/2018 09:22 AM     Modules accepted: Orders       Apply ice  on your joint for 15 to 20 minutes every hour or as directed. Use an ice pack, or put crushed ice in a plastic bag. Cover it with a towel. Ice helps prevent tissue damage and decreases swelling and pain.    © Copyright [employer] 2023 Information is for End User's use only and may not be sold, redistributed or otherwise used for commercial purposes.  The above information is an  only. It is not intended as medical advice for individual conditions or treatments. Talk to your doctor, nurse or pharmacist before following any medical regimen to see if it is safe and effective for you.

## 2024-02-28 ENCOUNTER — OFFICE VISIT (OUTPATIENT)
Dept: INTERNAL MEDICINE CLINIC | Facility: CLINIC | Age: 49
End: 2024-02-28
Payer: COMMERCIAL

## 2024-02-28 VITALS
OXYGEN SATURATION: 99 % | HEART RATE: 82 BPM | DIASTOLIC BLOOD PRESSURE: 78 MMHG | BODY MASS INDEX: 25.41 KG/M2 | TEMPERATURE: 97.5 F | HEIGHT: 74 IN | RESPIRATION RATE: 16 BRPM | SYSTOLIC BLOOD PRESSURE: 122 MMHG | WEIGHT: 198 LBS

## 2024-02-28 DIAGNOSIS — J02.9 PHARYNGITIS, UNSPECIFIED ETIOLOGY: Primary | ICD-10-CM

## 2024-02-28 DIAGNOSIS — R94.6 ABNORMAL THYROID FUNCTION TEST: ICD-10-CM

## 2024-02-28 PROBLEM — B34.9 VIRAL INFECTION, UNSPECIFIED: Status: RESOLVED | Noted: 2021-10-19 | Resolved: 2024-02-28

## 2024-02-28 PROCEDURE — 99213 OFFICE O/P EST LOW 20 MIN: CPT | Performed by: INTERNAL MEDICINE

## 2024-02-28 RX ORDER — METHYLPREDNISOLONE 4 MG/1
TABLET ORAL
Qty: 21 EACH | Refills: 0 | Status: SHIPPED | OUTPATIENT
Start: 2024-02-28

## 2024-02-28 NOTE — PROGRESS NOTES
Assessment/Plan:    Problem List Items Addressed This Visit     Abnormal thyroid function test    Relevant Orders    TSH, 3rd generation with Free T4 reflex    Comprehensive metabolic panel    Lipid panel   Other Visit Diagnoses     Pharyngitis, unspecified etiology    -  Primary    -no other symptom, exam consistent w/ rhinosinusitis  -rec flonase nasal spray, cont claritin  -rx medrol dose mic, side effects discussed if not improved    Relevant Medications    methylPREDNISolone 4 MG tablet therapy pack          Subjective:      Patient ID: Wilfred Man is a 48 y.o. male.    HPI    Has sore throat that began three days ago.  He denies fever, cough, nasal congestion, ear fullness, vomiting, difficulty chewing, SOB.  He is going away this weekend.  Painful to swallowing liquids and solids.  He has been using a spray, dayquil, nyquil.  Reports contact with strep throat 3-4 weeks ago.     The following portions of the patient's history were reviewed and updated as appropriate: allergies, current medications, past family history, past medical history, past social history, past surgical history and problem list.      Current Outpatient Medications:   •  diclofenac sodium (VOLTAREN) 1 %, Apply 2 g topically 4 (four) times a day, Disp: 100 g, Rfl: 0  •  loratadine (CLARITIN) 10 mg tablet, Take 10 mg by mouth, Disp: , Rfl:   •  meloxicam (MOBIC) 7.5 mg tablet, Take 1 tablet (7.5 mg total) by mouth daily, Disp: 90 tablet, Rfl: 3  •  methylPREDNISolone 4 MG tablet therapy pack, Use as directed on package, Disp: 21 each, Rfl: 0  •  Multiple Vitamin (MULTIVITAMINS PO), Take by mouth, Disp: , Rfl:   •  Omega-3 Fatty Acids (FISH OIL) 645 MG CAPS, Take by mouth, Disp: , Rfl:   •  podofilox (CONDYLOX) 0.5 % gel, Apply topically 2 (two) times a day Apply for 3 days followed by three days of no therapy. Repeat for 4 cycles, Disp: 3.5 g, Rfl: 0  •  polyethylene glycol (GLYCOLAX) 17 GM/SCOOP powder, At 5pm take 5mgx2 dulcolax. At  "6pm 32oz miralax in 64oz gatorade. Drink 8oz glass every 5 mins until 32oz finished. Drink remaining as rec., Disp: 238 g, Rfl: 0    Review of Systems   Constitutional:  Negative for activity change, appetite change and fever.   HENT:  Positive for sore throat and trouble swallowing. Negative for congestion, postnasal drip, rhinorrhea and sneezing.    Respiratory:  Negative for cough, shortness of breath and wheezing.    Musculoskeletal:  Negative for myalgias.   Neurological:  Negative for headaches.         Objective:    /78 (BP Location: Left arm, Patient Position: Sitting, Cuff Size: Large)   Pulse 82   Temp 97.5 °F (36.4 °C) (Tympanic Core)   Resp 16   Ht 6' 2\" (1.88 m)   Wt 89.8 kg (198 lb)   SpO2 99%   BMI 25.42 kg/m²      Physical Exam  Vitals reviewed.   HENT:      Head: Normocephalic.      Right Ear: Tympanic membrane and ear canal normal.      Left Ear: Tympanic membrane and ear canal normal.      Nose: Congestion (R.L nasal turbinate hypertrophy) and rhinorrhea present.      Mouth/Throat:      Mouth: Mucous membranes are moist.      Pharynx: No oropharyngeal exudate or posterior oropharyngeal erythema.   Cardiovascular:      Rate and Rhythm: Normal rate and regular rhythm.      Pulses: Normal pulses.      Heart sounds: Normal heart sounds.   Pulmonary:      Effort: Pulmonary effort is normal. No respiratory distress.      Breath sounds: Normal breath sounds. No wheezing.   Musculoskeletal:      Cervical back: No tenderness.   Lymphadenopathy:      Cervical: No cervical adenopathy.   Skin:     Coloration: Skin is not pale.   Neurological:      Mental Status: He is alert and oriented to person, place, and time.         "

## 2024-04-22 ENCOUNTER — APPOINTMENT (OUTPATIENT)
Dept: LAB | Facility: CLINIC | Age: 49
End: 2024-04-22
Payer: COMMERCIAL

## 2024-04-22 DIAGNOSIS — R94.6 ABNORMAL THYROID FUNCTION TEST: ICD-10-CM

## 2024-04-22 LAB
CHOLEST SERPL-MCNC: 138 MG/DL
HDLC SERPL-MCNC: 73 MG/DL
LDLC SERPL CALC-MCNC: 48 MG/DL (ref 0–100)
NONHDLC SERPL-MCNC: 65 MG/DL
TRIGL SERPL-MCNC: 83 MG/DL
TSH SERPL DL<=0.05 MIU/L-ACNC: 2.15 UIU/ML (ref 0.45–4.5)

## 2024-04-22 PROCEDURE — 36415 COLL VENOUS BLD VENIPUNCTURE: CPT

## 2024-04-22 PROCEDURE — 80061 LIPID PANEL: CPT

## 2024-04-22 PROCEDURE — 84443 ASSAY THYROID STIM HORMONE: CPT

## 2024-04-22 PROCEDURE — 80053 COMPREHEN METABOLIC PANEL: CPT

## 2024-04-23 LAB
ALBUMIN SERPL BCP-MCNC: 4.5 G/DL (ref 3.5–5)
ALP SERPL-CCNC: 46 U/L (ref 34–104)
ALT SERPL W P-5'-P-CCNC: 19 U/L (ref 7–52)
ANION GAP SERPL CALCULATED.3IONS-SCNC: 7 MMOL/L (ref 4–13)
AST SERPL W P-5'-P-CCNC: 23 U/L (ref 13–39)
BILIRUB SERPL-MCNC: 0.36 MG/DL (ref 0.2–1)
BUN SERPL-MCNC: 17 MG/DL (ref 5–25)
CALCIUM SERPL-MCNC: 9.3 MG/DL (ref 8.4–10.2)
CHLORIDE SERPL-SCNC: 102 MMOL/L (ref 96–108)
CO2 SERPL-SCNC: 28 MMOL/L (ref 21–32)
CREAT SERPL-MCNC: 0.96 MG/DL (ref 0.6–1.3)
GFR SERPL CREATININE-BSD FRML MDRD: 92 ML/MIN/1.73SQ M
GLUCOSE P FAST SERPL-MCNC: 163 MG/DL (ref 65–99)
POTASSIUM SERPL-SCNC: 4.2 MMOL/L (ref 3.5–5.3)
PROT SERPL-MCNC: 6.8 G/DL (ref 6.4–8.4)
SODIUM SERPL-SCNC: 137 MMOL/L (ref 135–147)

## 2024-04-29 ENCOUNTER — OFFICE VISIT (OUTPATIENT)
Dept: INTERNAL MEDICINE CLINIC | Facility: CLINIC | Age: 49
End: 2024-04-29
Payer: COMMERCIAL

## 2024-04-29 VITALS
HEIGHT: 74 IN | DIASTOLIC BLOOD PRESSURE: 78 MMHG | HEART RATE: 81 BPM | SYSTOLIC BLOOD PRESSURE: 116 MMHG | RESPIRATION RATE: 16 BRPM | WEIGHT: 191 LBS | OXYGEN SATURATION: 98 % | BODY MASS INDEX: 24.51 KG/M2 | TEMPERATURE: 97.1 F

## 2024-04-29 DIAGNOSIS — R73.9 HYPERGLYCEMIA: ICD-10-CM

## 2024-04-29 DIAGNOSIS — Z00.00 ANNUAL PHYSICAL EXAM: Primary | ICD-10-CM

## 2024-04-29 DIAGNOSIS — Z12.5 SCREENING FOR PROSTATE CANCER: ICD-10-CM

## 2024-04-29 DIAGNOSIS — R94.6 ABNORMAL THYROID FUNCTION TEST: ICD-10-CM

## 2024-04-29 LAB — SL AMB POCT HEMOGLOBIN AIC: 5.5 (ref ?–6.5)

## 2024-04-29 PROCEDURE — 99396 PREV VISIT EST AGE 40-64: CPT | Performed by: INTERNAL MEDICINE

## 2024-04-29 PROCEDURE — 3725F SCREEN DEPRESSION PERFORMED: CPT | Performed by: INTERNAL MEDICINE

## 2024-04-29 PROCEDURE — 83036 HEMOGLOBIN GLYCOSYLATED A1C: CPT | Performed by: INTERNAL MEDICINE

## 2024-04-29 NOTE — PROGRESS NOTES
ADULT ANNUAL PHYSICAL  [facility] INTERNAL MEDICINE    NAME: Wilfred Man  AGE: 48 y.o. SEX: male  : 1975     DATE: 2024     Assessment and Plan:     Problem List Items Addressed This Visit     Abnormal thyroid function test     -TSH now normal  -neg thyroid antibodies  -will montior         Relevant Orders    TSH, 3rd generation with Free T4 reflex   Other Visit Diagnoses     Annual physical exam    -  Primary    Screening for prostate cancer        Relevant Orders    PSA, Total Screen    Hyperglycemia        -a1c is normal  -adhere to low carb diet  -has FH of DM.  Monitor    Relevant Orders    POCT hemoglobin A1c (Completed)    Glucose, fasting          Immunizations and preventive care screenings were discussed with patient today. Appropriate education was printed on patient's after visit summary.    Discussed risks and benefits of prostate cancer screening. We discussed the controversial history of PSA screening for prostate cancer in the [place] as well as the risk of over detection and over treatment of prostate cancer by way of PSA screening.  The patient understands that PSA blood testing is an imperfect way to screen for prostate cancer and that elevated PSA levels in the blood may also be caused by infection, inflammation, prostatic trauma or manipulation, urological procedures, or by benign prostatic enlargement.    The role of the digital rectal examination in prostate cancer screening was also discussed and I discussed with him that there is large interobserver variability in the findings of digital rectal examination.    Counseling:  Alcohol/drug use: discussed moderation in alcohol intake, the recommendations for healthy alcohol use, and avoidance of illicit drug use.  Dental Health: discussed importance of regular tooth brushing, flossing, and dental visits.  Exercise: the importance of regular exercise/physical activity was  discussed. Recommend exercise 3-5 times per week for at least 30 minutes.   Immunizations discussed.  Recommend yearly influenza, updated COVID, tdap, prevnar 20.  Recommend shingrix at age 50yo.  Cancer screenings discussed.  Next colon cancer screen due 2032.      Depression Screening and Follow-up Plan: Patient was screened for depression during today's encounter. They screened negative with a PHQ-2 score of 0.    Tobacco Cessation Counseling: Tobacco cessation counseling was provided. The patient is sincerely urged to quit consumption of tobacco. He is not ready to quit tobacco. Medication options not discussed.         Return in about 1 year (around 4/29/2025) for Annual physical.     Chief Complaint:     Chief Complaint   Patient presents with   • Physical Exam      History of Present Illness:     Adult Annual Physical   Patient here for a comprehensive physical exam.     Diet and Physical Activity  Diet/Nutrition:  regular diet . Does not snack a lot  Exercise: walking, strength training exercises, 5-7 times a week on average, and running .      Depression Screening  PHQ-2/9 Depression Screening    Little interest or pleasure in doing things: 0 - not at all  Feeling down, depressed, or hopeless: 0 - not at all  PHQ-2 Score: 0  PHQ-2 Interpretation: Negative depression screen       General Health  Sleep: gets 7-8 hours of sleep on average.   Hearing: normal - bilateral.  Vision: most recent eye exam <1 year ago.   Dental: regular dental visits.        Health  Symptoms include: weak urinary stream    Advanced Care Planning  Do you have an advanced directive? no  Do you have a durable medical power of ? no  ACP document given to patient? yes     Review of Systems:     Review of Systems   Constitutional:  Positive for unexpected weight change (weight loss). Negative for activity change and appetite change.   HENT:  Negative for congestion, postnasal drip and rhinorrhea.    Respiratory:  Negative for  cough, chest tightness, shortness of breath and wheezing.    Cardiovascular:  Negative for chest pain, palpitations and leg swelling.   Gastrointestinal:  Negative for abdominal pain, constipation, diarrhea, nausea and vomiting.   Genitourinary:  Positive for decreased urine volume.        Nocturia    Musculoskeletal:  Negative for arthralgias.   Neurological:  Negative for dizziness and headaches.      Past Medical History:     Past Medical History:   Diagnosis Date   • Acute pain of right knee 09/10/2019   • Viral infection, unspecified       Past Surgical History:     Past Surgical History:   Procedure Laterality Date   • CONDYLOMA EXCISION/FULGURATION     • HERNIA REPAIR Left 2016   • WISDOM TOOTH EXTRACTION  2014      Family History:     Family History   Problem Relation Age of Onset   • No Known Problems Mother    • Diabetes Paternal Grandfather         mellitus    • Diabetes Paternal Uncle         mellitus       Social History:     Social History     Socioeconomic History   • Marital status: Single     Spouse name: None   • Number of children: None   • Years of education: None   • Highest education level: None   Occupational History   • Occupation: [employer]    Tobacco Use   • Smoking status: Every Day     Current packs/day: 0.25     Types: Cigarettes   • Smokeless tobacco: Never   • Tobacco comments:     3 packs per week   Vaping Use   • Vaping status: Never Used   Substance and Sexual Activity   • Alcohol use: Yes     Comment: drinks 6 drinks per week   • Drug use: Yes     Frequency: 3.0 times per week     Types: Marijuana     Comment: denies IVDA   • Sexual activity: Yes     Partners: Female   Other Topics Concern   • None   Social History Narrative         Social Determinants of Health     Financial Resource Strain: Not on file   Food Insecurity: Not on file   Transportation Needs: Not on file   Physical Activity: Not on file   Stress: Not on file   Social Connections: Not on file   Intimate Partner  "Violence: Not on file   Housing Stability: Not on file      Current Medications:     Current Outpatient Medications   Medication Sig Dispense Refill   • diclofenac sodium (VOLTAREN) 1 % Apply 2 g topically 4 (four) times a day 100 g 0   • loratadine (CLARITIN) 10 mg tablet Take 10 mg by mouth     • meloxicam (MOBIC) 7.5 mg tablet Take 1 tablet (7.5 mg total) by mouth daily 90 tablet 3   • Multiple Vitamin (MULTIVITAMINS PO) Take by mouth     • Omega-3 Fatty Acids (FISH OIL) 645 MG CAPS Take by mouth     • podofilox (CONDYLOX) 0.5 % gel Apply topically 2 (two) times a day Apply for 3 days followed by three days of no therapy. Repeat for 4 cycles 3.5 g 0   • polyethylene glycol (GLYCOLAX) 17 GM/SCOOP powder At 5pm take 5mgx2 dulcolax. At 6pm 32oz miralax in 64oz gatorade. Drink 8oz glass every 5 mins until 32oz finished. Drink remaining as rec. 238 g 0     No current facility-administered medications for this visit.      Allergies:     No Known Allergies   Physical Exam:     /78 (BP Location: Left arm, Patient Position: Sitting, Cuff Size: Standard)   Pulse 81   Temp (!) 97.1 °F (36.2 °C) (Tympanic Core)   Resp 16   Ht 6' 2\" (1.88 m)   Wt 86.6 kg (191 lb)   SpO2 98%   BMI 24.52 kg/m²     Physical Exam  Vitals reviewed.   Constitutional:       General: He is not in acute distress.  HENT:      Head: Normocephalic.      Right Ear: Tympanic membrane and ear canal normal.      Left Ear: Tympanic membrane and ear canal normal.      Nose: Nose normal.      Mouth/Throat:      Mouth: Mucous membranes are moist.   Eyes:      Extraocular Movements: Extraocular movements intact.      Conjunctiva/sclera: Conjunctivae normal.      Pupils: Pupils are equal, round, and reactive to light.   Neck:      Thyroid: No thyromegaly or thyroid tenderness.   Cardiovascular:      Rate and Rhythm: Normal rate and regular rhythm.      Pulses: Normal pulses.      Heart sounds: Normal heart sounds.   Pulmonary:      Effort: Pulmonary " effort is normal. No respiratory distress.      Breath sounds: Normal breath sounds. No wheezing.   Abdominal:      General: Bowel sounds are normal. There is no distension.      Palpations: Abdomen is soft.      Tenderness: There is no abdominal tenderness.   Musculoskeletal:      Cervical back: Neck supple. No tenderness.      Right lower leg: No edema.      Left lower leg: No edema.   Lymphadenopathy:      Cervical: No cervical adenopathy.   Skin:     Coloration: Skin is not pale.   Neurological:      General: No focal deficit present.      Mental Status: He is alert and oriented to person, place, and time.   Psychiatric:         Mood and Affect: Mood normal.          Recent Results (from the past 336 hour(s))   TSH, 3rd generation with Free T4 reflex    Collection Time: 04/22/24  9:44 AM   Result Value Ref Range    TSH 3RD GENERATON 2.153 0.450 - 4.500 uIU/mL   Comprehensive metabolic panel    Collection Time: 04/22/24  9:44 AM   Result Value Ref Range    Sodium 137 135 - 147 mmol/L    Potassium 4.2 3.5 - 5.3 mmol/L    Chloride 102 96 - 108 mmol/L    CO2 28 21 - 32 mmol/L    ANION GAP 7 4 - 13 mmol/L    BUN 17 5 - 25 mg/dL    Creatinine 0.96 0.60 - 1.30 mg/dL    Glucose, Fasting 163 (H) 65 - 99 mg/dL    Calcium 9.3 8.4 - 10.2 mg/dL    AST 23 13 - 39 U/L    ALT 19 7 - 52 U/L    Alkaline Phosphatase 46 34 - 104 U/L    Total Protein 6.8 6.4 - 8.4 g/dL    Albumin 4.5 3.5 - 5.0 g/dL    Total Bilirubin 0.36 0.20 - 1.00 mg/dL    eGFR 92 ml/min/1.73sq m   Lipid panel    Collection Time: 04/22/24  9:44 AM   Result Value Ref Range    Cholesterol 138 See Comment mg/dL    Triglycerides 83 See Comment mg/dL    HDL, Direct 73 >=40 mg/dL    LDL Calculated 48 0 - 100 mg/dL    Non-HDL-Chol (CHOL-HDL) 65 mg/dl   POCT hemoglobin A1c    Collection Time: 04/29/24 11:08 AM   Result Value Ref Range    Hemoglobin A1C 5.5 6.5         Sneha Quijano DO  [facility] INTERNAL MEDICINE

## 2024-04-29 NOTE — PATIENT INSTRUCTIONS
Wellness Visit for Adults   AMBULATORY CARE:   A wellness visit  is when you see your healthcare provider to get screened for health problems. Your healthcare provider will also give you advice on how to stay healthy. Write down your questions so you remember to ask them. Ask your healthcare provider how often you should have a wellness visit.  What happens at a wellness visit:  Your healthcare provider will ask about your health, and your family history of health problems. This includes high blood pressure, heart disease, and cancer. He or she will ask if you have symptoms that concern you, if you smoke, and about your mood. You may also be asked about your intake of medicines, supplements, food, and alcohol. Any of the following may be done:  Your weight  will be checked. Your height may also be checked so your body mass index (BMI) can be calculated. Your BMI shows if you are at a healthy weight.    Your blood pressure  and heart rate will be checked. Your temperature may also be checked.    Blood and urine tests  may be done. Blood tests may be done to check your cholesterol levels. Abnormal cholesterol levels increase your risk for heart disease and stroke. You may also need a blood or urine test to check for diabetes if you are at increased risk. Urine tests may be done to look for signs of an infection or kidney disease.    A physical exam  includes checking your heartbeat and lungs with a stethoscope. Your healthcare provider may also check your skin to look for sun damage.    Screening tests  may be recommended. A screening test is done to check for diseases that may not cause symptoms. The screening tests you may need depend on your age, gender, family history, and lifestyle habits. For example, colorectal screening may be recommended if you are 50 years old or older.    Screening tests you need if you are a woman:   A Pap smear  is used to screen for cervical cancer. Pap smears are usually done every 3 to  5 years depending on your age. You may need them more often if you have had abnormal Pap smear test results in the past. Ask your healthcare provider how often you should have a Pap smear.    A mammogram  is an x-ray of your breasts to screen for breast cancer. Experts recommend mammograms every 2 years starting at age 50 years. You may need a mammogram at age 49 years or younger if you have an increased risk for breast cancer. Talk to your healthcare provider about when you should start having mammograms and how often you need them.    Vaccines you may need:   Get an influenza vaccine  every year. The influenza vaccine protects you from the flu. Several types of viruses cause the flu. The viruses change over time, so new vaccines are made each year.    Get a tetanus-diphtheria (Td) booster vaccine  every 10 years. This vaccine protects you against tetanus and diphtheria. Tetanus is a severe infection that may cause painful muscle spasms and lockjaw. Diphtheria is a severe bacterial infection that causes a thick covering in the back of your mouth and throat.    Get a human papillomavirus (HPV) vaccine  if you are female and aged 19 to 26 or male 19 to 21 and never received it. This vaccine protects you from HPV infection. HPV is the most common infection spread by sexual contact. HPV may also cause vaginal, penile, and anal cancers.    Get a pneumococcal vaccine  if you are aged 65 years or older. The pneumococcal vaccine is an injection given to protect you from pneumococcal disease. Pneumococcal disease is an infection caused by pneumococcal bacteria. The infection may cause pneumonia, meningitis, or an ear infection.    Get a shingles vaccine  if you are 60 or older, even if you have had shingles before. The shingles vaccine is an injection to protect you from the varicella-zoster virus. This is the same virus that causes chickenpox. Shingles is a painful rash that develops in people who had chickenpox or have  been exposed to the virus.    How to eat healthy:  My Plate is a model for planning healthy meals. It shows the types and amounts of foods that should go on your plate. Fruits and vegetables make up about half of your plate, and grains and protein make up the other half. A serving of dairy is included on the side of your plate. The amount of calories and serving sizes you need depends on your age, gender, weight, and height. Examples of healthy foods are listed below:  Eat a variety of vegetables  such as dark green, red, and orange vegetables. You can also include canned vegetables low in sodium (salt) and frozen vegetables without added butter or sauces.    Eat a variety of fresh fruits , canned fruit in 100% juice, frozen fruit, and dried fruit.    Include whole grains.  At least half of the grains you eat should be whole grains. Examples include whole-wheat bread, wheat pasta, brown rice, and whole-grain cereals such as oatmeal.    Eat a variety of protein foods such as seafood (fish and shellfish), lean meat, and poultry without skin (turkey and chicken). Examples of lean meats include pork leg, shoulder, or tenderloin, and beef round, sirloin, tenderloin, and extra lean ground beef. Other protein foods include eggs and egg substitutes, beans, peas, soy products, nuts, and seeds.    Choose low-fat dairy products such as skim or 1% milk or low-fat yogurt, cheese, and cottage cheese.    Limit unhealthy fats  such as butter, hard margarine, and shortening.       Exercise:  Exercise at least 30 minutes per day on most days of the week. Some examples of exercise include walking, biking, dancing, and swimming. You can also fit in more physical activity by taking the stairs instead of the elevator or parking farther away from stores. Include muscle strengthening activities 2 days each week. Regular exercise provides many health benefits. It helps you manage your weight, and decreases your risk for type 2 diabetes,  heart disease, stroke, and high blood pressure. Exercise can also help improve your mood. Ask your healthcare provider about the best exercise plan for you.       General health and safety guidelines:   Do not smoke.  Nicotine and other chemicals in cigarettes and cigars can cause lung damage. Ask your healthcare provider for information if you currently smoke and need help to quit. E-cigarettes or smokeless tobacco still contain nicotine. Talk to your healthcare provider before you use these products.    Limit alcohol.  A drink of alcohol is 12 ounces of beer, 5 ounces of wine, or 1½ ounces of liquor.    Lose weight, if needed.  Being overweight increases your risk of certain health conditions. These include heart disease, high blood pressure, type 2 diabetes, and certain types of cancer.    Protect your skin.  Do not sunbathe or use tanning beds. Use sunscreen with a SPF 15 or higher. Apply sunscreen at least 15 minutes before you go outside. Reapply sunscreen every 2 hours. Wear protective clothing, hats, and sunglasses when you are outside.    Drive safely.  Always wear your seatbelt. Make sure everyone in your car wears a seatbelt. A seatbelt can save your life if you are in an accident. Do not use your cell phone when you are driving. This could distract you and cause an accident. Pull over if you need to make a call or send a text message.    Practice safe sex.  Use latex condoms if are sexually active and have more than one partner. Your healthcare provider may recommend screening tests for sexually transmitted infections (STIs).    Wear helmets, lifejackets, and protective gear.  Always wear a helmet when you ride a bike or motorcycle, go skiing, or play sports that could cause a head injury. Wear protective equipment when you play sports. Wear a lifejacket when you are on a boat or doing water sports.    © Copyright [employer] 2023 Information is for End User's use only and may not be sold, redistributed or  otherwise used for commercial purposes.  The above information is an  only. It is not intended as medical advice for individual conditions or treatments. Talk to your doctor, nurse or pharmacist before following any medical regimen to see if it is safe and effective for you.    Cholesterol and Your Health   AMBULATORY CARE:   Cholesterol  is a waxy, fat-like substance. Your body uses cholesterol to make hormones and new cells, and to protect nerves. Cholesterol is made by your body. It also comes from certain foods you eat, such as meat and dairy products. Your healthcare provider can help you set goals for your cholesterol levels. Your provider can help you create a plan to meet your goals.  Cholesterol level goals:  Your cholesterol level goals depend on your risk for heart disease, your age, and your other health conditions. The following are general guidelines:  Total cholesterol  includes low-density lipoprotein (LDL), high-density lipoprotein (HDL), and triglyceride levels. The total cholesterol level should be lower than 200 mg/dL and is best at about 150 mg/dL.    LDL cholesterol  is called bad cholesterol  because it forms plaque in your arteries. As plaque builds up, your arteries become narrow, and less blood flows through. When plaque decreases blood flow to your heart, you may have chest pain. If plaque completely blocks an artery that brings blood to your heart, you may have a heart attack. Plaque can break off and form blood clots. Blood clots may block arteries in your brain and cause a stroke. The level should be less than 130 mg/dL and is best at about 100 mg/dL.         HDL cholesterol  is called good cholesterol  because it helps remove LDL cholesterol from your arteries. It does this by attaching to LDL cholesterol and carrying it to your liver. Your liver breaks down LDL cholesterol so your body can get rid of it. High levels of HDL cholesterol can help prevent a heart attack and  stroke. Low levels of HDL cholesterol can increase your risk for heart disease, heart attack, and stroke. The level should be at least 40 mg/dL in males or at least 50 mg/dL in females.    Triglycerides  are a type of fat that store energy from foods you eat. High levels of triglycerides also cause plaque buildup. This can increase your risk for a heart attack or stroke. If your triglyceride level is high, your LDL cholesterol level may also be high. The level should be less than 150 mg/dL.    Any of the following can increase your risk for high cholesterol:   Smoking or drinking large amounts of alcohol    Having overweight or obesity, or not getting enough exercise    A medical condition such as hypertension (high blood pressure) or diabetes    A family history of high cholesterol    Age older than 65    What you need to know about having your cholesterol levels checked:  Adults 20 to 45 years of age should have their cholesterol levels checked every 4 to 6 years. Adults 45 years or older should have their cholesterol checked every 1 to 2 years. You may need your cholesterol checked more often, or at a younger age, if you have risk factors for heart disease. You may also need to have your cholesterol checked more often if you have other health conditions, such as diabetes. Blood tests are used to check cholesterol levels. Blood tests measure your levels of triglycerides, LDL cholesterol, and HDL cholesterol.  How healthy fats affect your cholesterol levels:  Healthy fats, also called unsaturated fats, help lower LDL cholesterol and triglyceride levels. Healthy fats include the following:  Monounsaturated fats  are found in foods such as olive oil, canola oil, avocado, nuts, and olives.    Polyunsaturated fats,  such as omega 3 fats, are found in fish, such as salmon, trout, and tuna. They can also be found in plant foods such as flaxseed, walnuts, and soybeans.    How unhealthy fats affect your cholesterol levels:   Unhealthy fats increase LDL cholesterol and triglyceride levels. They are found in foods high in cholesterol, saturated fat, and trans fat:  Cholesterol  is found in eggs, dairy, and meat.    Saturated fat  is found in butter, cheese, ice cream, whole milk, and coconut oil. Saturated fat is also found in meat, such as sausage, hot dogs, and bologna.    Trans fat  is found in liquid oils and is used in fried and baked foods. Foods that contain trans fats include chips, crackers, muffins, sweet rolls, microwave popcorn, and cookies.    Treatment  for high cholesterol will also decrease your risk of heart disease, heart attack, and stroke. Treatment may include any of the following:  Lifestyle changes  may include food, exercise, weight loss, and quitting smoking. You may also need to decrease the amount of alcohol you drink. Your healthcare provider will want you to start with lifestyle changes. Other treatment may be added if lifestyle changes are not enough. Your healthcare provider may recommend you work with a team to manage hyperlipidemia. The team may include medical experts such as a dietitian, an exercise or physical therapist, and a behavior therapist. Your family members may be included in helping you create lifestyle changes.    Medicines  may be given to lower your LDL cholesterol, triglyceride levels, or total cholesterol level. You may need medicines to lower your cholesterol if any of the following is true:    You have a history of stroke, TIA, unstable angina, or a heart attack.    Your LDL cholesterol level is 190 mg/dL or higher.    You are age 40 to 75 years, have diabetes or heart disease risk factors, and your LDL cholesterol is 70 mg/dL or higher.    Supplements  include fish oil, red yeast rice, and garlic. Fish oil may help lower your triglyceride and LDL cholesterol levels. It may also increase your HDL cholesterol level. Red yeast rice may help decrease your total cholesterol level and LDL  cholesterol level. Garlic may help lower your total cholesterol level. Do not take any supplements without talking to your healthcare provider.    Food changes you can make to lower your cholesterol levels:  A dietitian can help you create a healthy eating plan. Your dietitian can show you how to read food labels and choose foods low in saturated fat, trans fats, and cholesterol.     Decrease the total amount of fat you eat.  Choose lean meats, fat-free or 1% fat milk, and low-fat dairy products, such as yogurt and cheese. Try to limit or avoid red meats. Limit or do not eat fried foods or baked goods, such as cookies.    Replace unhealthy fats with healthy fats.  Cook foods in olive oil or canola oil. Choose soft margarines that are low in saturated fat and trans fat. Seeds, nuts, and avocados are other examples of healthy fats.    Eat foods with omega-3 fats.  Examples include salmon, tuna, mackerel, walnuts, and flaxseed. Eat fish 2 times per week. Pregnant women should not eat fish that have high levels of mercury, such as shark, swordfish, and inocencia mackerel.         Increase the amount of high-fiber foods you eat.  High-fiber foods can help lower your LDL cholesterol. Aim to get between 20 and 30 grams of fiber each day. Fruits and vegetables are high in fiber. Eat at least 5 servings each day. Other high-fiber foods are whole-grain or whole-wheat breads, pastas, or cereals, and brown rice. Eat 3 ounces of whole-grain foods each day. Increase fiber slowly. You may have abdominal discomfort, bloating, and gas if you add fiber to your diet too quickly.         Eat healthy protein foods.  Examples include low-fat dairy products, skinless chicken and turkey, fish, and nuts.    Limit foods and drinks that are high in sugar.  Your dietitian or healthcare provider can help you create daily limits for high-sugar foods and drinks. The limit may be lower if you have diabetes or another health condition. Limits can also  help you lose weight if needed.  Lifestyle changes you can make to lower your cholesterol levels:   Maintain a healthy weight.  Ask your healthcare provider what a healthy weight is for you. Ask your provider to help you create a weight loss plan if needed. Weight loss can decrease your total cholesterol and triglyceride levels. Weight loss may also help keep your blood pressure at a healthy level.    Be physically active throughout the day.  Physical activity, such as exercise, can help lower your total cholesterol level and maintain a healthy weight. Physical activity can also help increase your HDL cholesterol level. Work with your healthcare provider to create an program that is right for you. Get at least 30 to 40 minutes of moderate physical activity most days of the week. Examples of exercise include brisk walking, swimming, or biking. Also include strength training at least 2 times each week. Your healthcare providers can help you create a physical activity plan.            Do not smoke.  Nicotine and other chemicals in cigarettes and cigars can raise your cholesterol levels. Ask your healthcare provider for information if you currently smoke and need help to quit. E-cigarettes or smokeless tobacco still contain nicotine. Talk to your healthcare provider before you use these products.         Limit or do not drink alcohol.  Alcohol can increase your triglyceride levels. Ask your healthcare provider before you drink alcohol. Ask how much is okay for you to drink in 24 hours or 1 week.    Follow up with your doctor as directed:  Write down your questions so you remember to ask them during your visits.  © Copyright [employer] 2023 Information is for End User's use only and may not be sold, redistributed or otherwise used for commercial purposes.  The above information is an  only. It is not intended as medical advice for individual conditions or treatments. Talk to your doctor, nurse or pharmacist  before following any medical regimen to see if it is safe and effective for you.

## 2024-10-02 ENCOUNTER — OFFICE VISIT (OUTPATIENT)
Dept: OBGYN CLINIC | Facility: HOSPITAL | Age: 49
End: 2024-10-02
Payer: COMMERCIAL

## 2024-10-02 VITALS — WEIGHT: 191 LBS | BODY MASS INDEX: 24.51 KG/M2 | HEIGHT: 74 IN

## 2024-10-02 DIAGNOSIS — M79.645 THUMB PAIN, LEFT: ICD-10-CM

## 2024-10-02 DIAGNOSIS — M18.12 PRIMARY OSTEOARTHRITIS OF FIRST CARPOMETACARPAL JOINT OF LEFT HAND: Primary | ICD-10-CM

## 2024-10-02 PROCEDURE — 99213 OFFICE O/P EST LOW 20 MIN: CPT | Performed by: ORTHOPAEDIC SURGERY

## 2024-10-02 PROCEDURE — 20600 DRAIN/INJ JOINT/BURSA W/O US: CPT | Performed by: ORTHOPAEDIC SURGERY

## 2024-10-02 RX ORDER — METHYLPREDNISOLONE ACETATE 40 MG/ML
0.5 INJECTION, SUSPENSION INTRA-ARTICULAR; INTRALESIONAL; INTRAMUSCULAR; SOFT TISSUE
Status: COMPLETED | OUTPATIENT
Start: 2024-10-02 | End: 2024-10-02

## 2024-10-02 RX ORDER — LIDOCAINE HYDROCHLORIDE 10 MG/ML
0.5 INJECTION, SOLUTION INFILTRATION; PERINEURAL
Status: COMPLETED | OUTPATIENT
Start: 2024-10-02 | End: 2024-10-02

## 2024-10-02 RX ORDER — MELOXICAM 7.5 MG/1
7.5 TABLET ORAL DAILY
Qty: 90 TABLET | Refills: 3 | Status: SHIPPED | OUTPATIENT
Start: 2024-10-02

## 2024-10-02 RX ADMIN — METHYLPREDNISOLONE ACETATE 0.5 ML: 40 INJECTION, SUSPENSION INTRA-ARTICULAR; INTRALESIONAL; INTRAMUSCULAR; SOFT TISSUE at 08:45

## 2024-10-02 RX ADMIN — LIDOCAINE HYDROCHLORIDE 0.5 ML: 10 INJECTION, SOLUTION INFILTRATION; PERINEURAL at 08:45

## 2024-10-02 NOTE — PROGRESS NOTES
ASSESSMENT/PLAN:    Assessment:   Thumb CMC Arthritis  left    Plan:   Patient provided with a repeat CMC injection today with depo  Tolerated well  Activities a tolerated  Comfort cool as needed      Follow Up:  3  month(s)    To Do Next Visit:   Repeat evaluation    General Discussions:     CMC Arthritis: The anatomy and physiology of carpometacarpal joint arthritis was discussed with the patient today in the office.  Deterioration of the articular cartilage eventually leads to hypermobility at the thumb CMC joint, resulting in joint subluxation, osteophyte formation, cystic changes within the trapezium and base of the first metacarpal, as well as subchondral sclerosis.  Eventually, pain, limited mobility, and compensatory hyperextension at the metacarpophalangeal joint may develop.  While normal activity and usage of the thumb joint may provide a painful experience to the patient, this typically does not result in damage to the thumb or hand.  Treatment options include resting thumb spica splints to decreased joint edema, pain, and inflammation.  Therapy exercises to strengthen the thenar musculature may relieve pain, but do not alter the overall continued development of osteoarthritis.  Oral medications, topical medications, corticosteroid injections may decrease pain and increase overall function.  Eventually, approximately 5% of patients may require surgical intervention.         _____________________________________________________  CHIEF COMPLAINT:  Chief Complaint   Patient presents with    Left Thumb - CMC     CMC - Depo         SUBJECTIVE:  Wilfred Man is a 49 y.o. male who presents for follow up regarding Thumb CMC Arthritis  left.  Since last visit, Wilfred Man has tried steroid injections with relief. Last injection 11/22/23 Today there is pain/ache to the left thumb.    Radiation: None, no numbness or tingling  Associated symptoms: None  Handedness: right  Work status: [employer]    PAST  MEDICAL HISTORY:  Past Medical History:   Diagnosis Date    Acute pain of right knee 09/10/2019    Viral infection, unspecified        PAST SURGICAL HISTORY:  Past Surgical History:   Procedure Laterality Date    CONDYLOMA EXCISION/FULGURATION      HERNIA REPAIR Left 2016    WISDOM TOOTH EXTRACTION  2014       FAMILY HISTORY:  Family History   Problem Relation Age of Onset    No Known Problems Mother     Diabetes Paternal Grandfather         mellitus     Diabetes Paternal Uncle         mellitus        SOCIAL HISTORY:  Social History     Tobacco Use    Smoking status: Every Day     Current packs/day: 0.25     Types: Cigarettes    Smokeless tobacco: Never    Tobacco comments:     3 packs per week   Vaping Use    Vaping status: Never Used   Substance Use Topics    Alcohol use: Yes     Comment: drinks 6 drinks per week    Drug use: Yes     Frequency: 3.0 times per week     Types: Marijuana     Comment: denies IVDA       MEDICATIONS:    Current Outpatient Medications:     diclofenac sodium (VOLTAREN) 1 %, Apply 2 g topically 4 (four) times a day, Disp: 100 g, Rfl: 0    loratadine (CLARITIN) 10 mg tablet, Take 10 mg by mouth, Disp: , Rfl:     meloxicam (MOBIC) 7.5 mg tablet, Take 1 tablet (7.5 mg total) by mouth daily, Disp: 90 tablet, Rfl: 3    Multiple Vitamin (MULTIVITAMINS PO), Take by mouth, Disp: , Rfl:     Omega-3 Fatty Acids (FISH OIL) 645 MG CAPS, Take by mouth, Disp: , Rfl:     podofilox (CONDYLOX) 0.5 % gel, Apply topically 2 (two) times a day Apply for 3 days followed by three days of no therapy. Repeat for 4 cycles, Disp: 3.5 g, Rfl: 0    polyethylene glycol (GLYCOLAX) 17 GM/SCOOP powder, At 5pm take 5mgx2 dulcolax. At 6pm 32oz miralax in 64oz gatorade. Drink 8oz glass every 5 mins until 32oz finished. Drink remaining as rec., Disp: 238 g, Rfl: 0    ALLERGIES:  No Known Allergies    REVIEW OF SYSTEMS:  Pertinent items are noted in HPI.  A comprehensive review of systems was negative.    LABS:  HgA1c:   Lab  "Results   Component Value Date    HGBA1C 5.5 04/29/2024     BMP:   Lab Results   Component Value Date    CALCIUM 9.3 04/22/2024    K 4.2 04/22/2024    CO2 28 04/22/2024     04/22/2024    BUN 17 04/22/2024    CREATININE 0.96 04/22/2024           _____________________________________________________  PHYSICAL EXAMINATION:  Vital signs: Ht 6' 2\" (1.88 m)   Wt 86.6 kg (191 lb)   BMI 24.52 kg/m²   General: well developed and well nourished, alert, oriented times 3, and appears comfortable  Psychiatric: Normal  HEENT: Trachea Midline, No torticollis  Cardiovascular: No discernable arrhythmia  Pulmonary: No wheezing or stridor  Abdomen: No rebound or guarding  Extremities: No peripheral edema  Skin: No Erythema, No Lacerations, No Fluctuation, No Ulcerations  Neurovascular: Sensation Intact to the Median, Ulnar, Radial Nerve, Motor Intact to the Median, Ulnar, Radial Nerve, and Pulses Intact    MUSCULOSKELETAL EXAMINATION:  LEFT SIDE:  CMC: Positive grind and Positive tendnerness CMC, (+) shoulder sign, no hyperextension, mild swelling. No locking/triggering laxity, negative finkelstein   _____________________________________________________  STUDIES REVIEWED:  No Studies to review      PROCEDURES PERFORMED:  Small joint arthrocentesis: L thumb CMC  Holly Protocol:  Consent: Verbal consent obtained.  Risks and benefits: risks, benefits and alternatives were discussed  Consent given by: patient  Time out: Immediately prior to procedure a \"time out\" was called to verify the correct patient, procedure, equipment, support staff and site/side marked as required.  Timeout called at: 11/22/2023 8:37 AM.  Patient understanding: patient states understanding of the procedure being performed  Site marked: the operative site was marked  Patient identity confirmed: verbally with patient  Supporting Documentation  Indications: pain, diagnostic evaluation and joint swelling   Procedure Details  Location: thumb - L thumb " CMC  Preparation: Patient was prepped and draped in the usual sterile fashion  Needle size: 25 G  Ultrasound guidance: no  Approach: volar  Medications administered: 0.5 mL lidocaine 1 %; 0.5 mL methylPREDNISolone acetate 40 mg/mL    Patient tolerance: patient tolerated the procedure well with no immediate complications  Dressing:  Sterile dressing applied          Scribe Attestation      I,:  Antonette Solitario MA am acting as a scribe while in the presence of the attending physician.:       I,:  Edson Hogan MD personally performed the services described in this documentation    as scribed in my presence.:

## 2024-11-18 NOTE — ASSESSMENT & PLAN NOTE
Early presentation  Patient reassurred, advised to continue with warm compress multiple times per day and topical antibiotic 3x per day  Has had multiple episodes, recommend labs  He is not diabetic based on A1c several months ago  Call if not improved  Patient's son, Alfredo, called and requested CT results.  The order was faxed to [facility] but they not been contacted with the results.  Patient requested a call back.

## 2025-01-28 NOTE — PROGRESS NOTES
[facility]- [place]  OFFICE VISIT     PATIENT INFORMATION     Wilfred Man   49 y.o. male   MRN: [id number]    ASSESSMENT/PLAN     Assessment & Plan  Scrotal pain  He presents today for concern of a left-sided scrotal swelling/discomfort..  States he feels like something is been off but is not experiencing pain.    Patient does have a history of condyloma status post excision in July 2018 with cytology negative for malignancy.  He also has a history of bilateral varicoceles diagnosed approximately 7 years ago.  Patient states that for the past few weeks he noticed some swelling discomfort in the left side of his testicle.  This is located posteriorly.  Examination negative for any lesions.  There is pain with palpation and posterior scrotum most likely consistent with varicocele.  Point-of-care urinalysis negative for signs of infection.  Patient also denies any new sexual partners.  Will obtain ultrasound to verify varicocele and rule out any other pathology.  Instructed patient to wear supportive undergarments such as jockstrap and to use Tylenol/ibuprofen as needed for pain relief.  If new pathology present or if no relief patient instructed to follow-up with urology.  Orders:    POCT urine dip    US scrotum and testicles; Future         HEALTH MAINTENANCE     Immunization History   Administered Date(s) Administered    COVID-19 PFIZER VACCINE 0.3 ML IM 04/21/2021, 05/12/2021, 01/04/2022    Influenza Quadrivalent Preservative Free 3 years and older IM 01/31/2017    Influenza Quadrivalent, 6-35 Months IM 12/18/2017    Influenza, injectable, quadrivalent, preservative free 0.5 mL 09/10/2019         CHIEF COMPLAINT     Chief Complaint   Patient presents with    Difficulty Urinating     With pain x 1 week       HISTORY OF PRESENT ILLNESS     Mr. Wilfred Man is a 49-year-old male with past medical history of bilateral varicoceles, abnormal thyroid function testing, smoking history. He presents  "today for concern of a urinary problem.  States he feels like something is been off but is not experiencing pain.  Patient does have a history of condyloma status post excision in July 2018 with cytology negative for malignancy.    REVIEW OF SYSTEMS     Review of Systems   Constitutional:  Negative for chills and fever.   HENT:  Negative for congestion and rhinorrhea.    Respiratory:  Negative for cough, shortness of breath and wheezing.    Cardiovascular:  Negative for chest pain and leg swelling.   Gastrointestinal:  Negative for abdominal pain, constipation, diarrhea, nausea and vomiting.   Genitourinary:  Positive for scrotal swelling and testicular pain. Negative for difficulty urinating, dysuria, hematuria, penile discharge and penile pain.   Musculoskeletal:  Negative for arthralgias and back pain.   Skin:  Negative for rash and wound.   Neurological:  Negative for dizziness, weakness and headaches.   Psychiatric/Behavioral:  Negative for agitation, behavioral problems and confusion.      OBJECTIVE     Vitals:    01/29/25 0849   BP: 128/78   Pulse: 77   Resp: 16   SpO2: 98%   Weight: 86.6 kg (191 lb)   Height: 6' 2\" (1.88 m)     Physical Exam  Constitutional:       Appearance: Normal appearance.   HENT:      Right Ear: External ear normal.      Left Ear: External ear normal.   Eyes:      Extraocular Movements: Extraocular movements intact.      Conjunctiva/sclera: Conjunctivae normal.      Pupils: Pupils are equal, round, and reactive to light.   Cardiovascular:      Rate and Rhythm: Normal rate and regular rhythm.      Pulses: Normal pulses.      Heart sounds: Normal heart sounds. No murmur heard.  Pulmonary:      Effort: Pulmonary effort is normal. No respiratory distress.      Breath sounds: Normal breath sounds. No wheezing, rhonchi or rales.   Abdominal:      General: Abdomen is flat. Bowel sounds are normal. There is no distension.      Palpations: Abdomen is soft.      Tenderness: There is no abdominal " tenderness.      Hernia: There is no hernia in the left inguinal area or right inguinal area.   Genitourinary:     Penis: No tenderness, discharge, swelling or lesions.       Testes:         Right: Swelling and varicocele present. Mass or tenderness not present.         Left: Tenderness, swelling and varicocele present. Mass not present.      Epididymis:      Right: Normal. No tenderness.      Left: Normal. No tenderness.   Musculoskeletal:      Right lower leg: No edema.      Left lower leg: No edema.   Skin:     General: Skin is warm and dry.   Neurological:      Mental Status: He is alert and oriented to person, place, and time.   Psychiatric:         Mood and Affect: Mood normal.         Behavior: Behavior normal.         Thought Content: Thought content normal.       CURRENT MEDICATIONS     Current Outpatient Medications:     diclofenac sodium (VOLTAREN) 1 %, Apply 2 g topically 4 (four) times a day, Disp: 100 g, Rfl: 0    loratadine (CLARITIN) 10 mg tablet, Take 10 mg by mouth, Disp: , Rfl:     meloxicam (MOBIC) 7.5 mg tablet, Take 1 tablet (7.5 mg total) by mouth daily, Disp: 90 tablet, Rfl: 3    Multiple Vitamin (MULTIVITAMINS PO), Take by mouth, Disp: , Rfl:     Omega-3 Fatty Acids (FISH OIL) 645 MG CAPS, Take by mouth, Disp: , Rfl:     podofilox (CONDYLOX) 0.5 % gel, Apply topically 2 (two) times a day Apply for 3 days followed by three days of no therapy. Repeat for 4 cycles, Disp: 3.5 g, Rfl: 0    polyethylene glycol (GLYCOLAX) 17 GM/SCOOP powder, At 5pm take 5mgx2 dulcolax. At 6pm 32oz miralax in 64oz gatorade. Drink 8oz glass every 5 mins until 32oz finished. Drink remaining as rec., Disp: 238 g, Rfl: 0    PAST MEDICAL & SURGICAL HISTORY     Past Medical History:   Diagnosis Date    Acute pain of right knee 09/10/2019    Viral infection, unspecified 10/19/2021     Past Surgical History:   Procedure Laterality Date    CONDYLOMA EXCISION/FULGURATION      HERNIA REPAIR Left 2016    WISDOM TOOTH EXTRACTION   2014     SOCIAL & FAMILY HISTORY     Social History     Socioeconomic History    Marital status: Single     Spouse name: Not on file    Number of children: Not on file    Years of education: Not on file    Highest education level: Not on file   Occupational History    Occupation: Cook    Tobacco Use    Smoking status: Every Day     Current packs/day: 0.25     Types: Cigarettes    Smokeless tobacco: Never    Tobacco comments:     3 packs per week   Vaping Use    Vaping status: Never Used   Substance and Sexual Activity    Alcohol use: Yes     Comment: drinks 6 drinks per week    Drug use: Yes     Frequency: 3.0 times per week     Types: Marijuana     Comment: bibiana [place]    Sexual activity: Yes     Partners: Female   Other Topics Concern    Not on file   Social History Narrative         Social Drivers of Health     Financial Resource Strain: Not on file   Food Insecurity: Not on file   Transportation Needs: Not on file   Physical Activity: Not on file   Stress: Not on file   Social Connections: Not on file   Intimate Partner Violence: Not on file   Housing Stability: Not on file     Social History     Substance and Sexual Activity   Alcohol Use Yes    Comment: drinks 6 drinks per week       Social History     Substance and Sexual Activity   Drug Use Yes    Frequency: 3.0 times per week    Types: Marijuana    Comment: bibiana [place]     Social History     Tobacco Use   Smoking Status Every Day    Current packs/day: 0.25    Types: Cigarettes   Smokeless Tobacco Never   Tobacco Comments    3 packs per week     Family History   Problem Relation Age of Onset    No Known Problems Mother     Diabetes Paternal Grandfather         mellitus     Diabetes Paternal Uncle         mellitus      ==  Alli Maravilla DO  [facility]  [address] [place] [id number]  Office: [phone]  Fax: [phone]

## 2025-01-29 ENCOUNTER — OFFICE VISIT (OUTPATIENT)
Age: 50
End: 2025-01-29
Payer: COMMERCIAL

## 2025-01-29 VITALS
WEIGHT: 191 LBS | HEART RATE: 77 BPM | HEIGHT: 74 IN | DIASTOLIC BLOOD PRESSURE: 78 MMHG | OXYGEN SATURATION: 98 % | SYSTOLIC BLOOD PRESSURE: 128 MMHG | RESPIRATION RATE: 16 BRPM | BODY MASS INDEX: 24.51 KG/M2

## 2025-01-29 DIAGNOSIS — N50.82 SCROTAL PAIN: Primary | ICD-10-CM

## 2025-01-29 LAB
SL AMB  POCT GLUCOSE, UA: NORMAL
SL AMB LEUKOCYTE ESTERASE,UA: NORMAL
SL AMB POCT BILIRUBIN,UA: NORMAL
SL AMB POCT BLOOD,UA: NORMAL
SL AMB POCT CLARITY,UA: NORMAL
SL AMB POCT COLOR,UA: YELLOW
SL AMB POCT KETONES,UA: NORMAL
SL AMB POCT NITRITE,UA: NORMAL
SL AMB POCT PH,UA: 6
SL AMB POCT SPECIFIC GRAVITY,UA: 1.02
SL AMB POCT URINE PROTEIN: NORMAL
SL AMB POCT UROBILINOGEN: NORMAL

## 2025-01-29 PROCEDURE — 81002 URINALYSIS NONAUTO W/O SCOPE: CPT | Performed by: STUDENT IN AN ORGANIZED HEALTH CARE EDUCATION/TRAINING PROGRAM

## 2025-01-29 PROCEDURE — 99213 OFFICE O/P EST LOW 20 MIN: CPT | Performed by: STUDENT IN AN ORGANIZED HEALTH CARE EDUCATION/TRAINING PROGRAM

## 2025-07-17 ENCOUNTER — RA CDI HCC (OUTPATIENT)
Dept: OTHER | Facility: HOSPITAL | Age: 50
End: 2025-07-17